# Patient Record
Sex: FEMALE | Race: WHITE | NOT HISPANIC OR LATINO | URBAN - METROPOLITAN AREA
[De-identification: names, ages, dates, MRNs, and addresses within clinical notes are randomized per-mention and may not be internally consistent; named-entity substitution may affect disease eponyms.]

---

## 2022-02-17 ENCOUNTER — INPATIENT (INPATIENT)
Facility: HOSPITAL | Age: 62
LOS: 2 days | Discharge: HOME | End: 2022-02-20
Attending: INTERNAL MEDICINE | Admitting: INTERNAL MEDICINE
Payer: MEDICAID

## 2022-02-17 VITALS
HEART RATE: 98 BPM | RESPIRATION RATE: 18 BRPM | OXYGEN SATURATION: 99 % | WEIGHT: 184.09 LBS | SYSTOLIC BLOOD PRESSURE: 132 MMHG | DIASTOLIC BLOOD PRESSURE: 67 MMHG | TEMPERATURE: 98 F

## 2022-02-17 LAB
ALBUMIN SERPL ELPH-MCNC: 4.1 G/DL — SIGNIFICANT CHANGE UP (ref 3.5–5.2)
ALP SERPL-CCNC: 42 U/L — SIGNIFICANT CHANGE UP (ref 30–115)
ALT FLD-CCNC: 16 U/L — SIGNIFICANT CHANGE UP (ref 0–41)
ANION GAP SERPL CALC-SCNC: 17 MMOL/L — HIGH (ref 7–14)
APTT BLD: 25.1 SEC — LOW (ref 27–39.2)
AST SERPL-CCNC: 16 U/L — SIGNIFICANT CHANGE UP (ref 0–41)
BASOPHILS # BLD AUTO: 0.03 K/UL — SIGNIFICANT CHANGE UP (ref 0–0.2)
BASOPHILS # BLD AUTO: 0.04 K/UL — SIGNIFICANT CHANGE UP (ref 0–0.2)
BASOPHILS NFR BLD AUTO: 0.3 % — SIGNIFICANT CHANGE UP (ref 0–1)
BASOPHILS NFR BLD AUTO: 0.4 % — SIGNIFICANT CHANGE UP (ref 0–1)
BILIRUB SERPL-MCNC: 0.8 MG/DL — SIGNIFICANT CHANGE UP (ref 0.2–1.2)
BLD GP AB SCN SERPL QL: SIGNIFICANT CHANGE UP
BUN SERPL-MCNC: 11 MG/DL — SIGNIFICANT CHANGE UP (ref 10–20)
CALCIUM SERPL-MCNC: 9.1 MG/DL — SIGNIFICANT CHANGE UP (ref 8.5–10.1)
CHLORIDE SERPL-SCNC: 98 MMOL/L — SIGNIFICANT CHANGE UP (ref 98–110)
CO2 SERPL-SCNC: 19 MMOL/L — SIGNIFICANT CHANGE UP (ref 17–32)
CREAT SERPL-MCNC: <0.5 MG/DL — LOW (ref 0.7–1.5)
EOSINOPHIL # BLD AUTO: 0.03 K/UL — SIGNIFICANT CHANGE UP (ref 0–0.7)
EOSINOPHIL # BLD AUTO: 0.04 K/UL — SIGNIFICANT CHANGE UP (ref 0–0.7)
EOSINOPHIL NFR BLD AUTO: 0.3 % — SIGNIFICANT CHANGE UP (ref 0–8)
EOSINOPHIL NFR BLD AUTO: 0.4 % — SIGNIFICANT CHANGE UP (ref 0–8)
GLUCOSE SERPL-MCNC: 229 MG/DL — HIGH (ref 70–99)
HCT VFR BLD CALC: 27.8 % — LOW (ref 37–47)
HCT VFR BLD CALC: 33.6 % — LOW (ref 37–47)
HGB BLD-MCNC: 11.1 G/DL — LOW (ref 12–16)
HGB BLD-MCNC: 9.4 G/DL — LOW (ref 12–16)
IMM GRANULOCYTES NFR BLD AUTO: 0.4 % — HIGH (ref 0.1–0.3)
IMM GRANULOCYTES NFR BLD AUTO: 0.6 % — HIGH (ref 0.1–0.3)
INR BLD: 1.01 RATIO — SIGNIFICANT CHANGE UP (ref 0.65–1.3)
LACTATE SERPL-SCNC: 2 MMOL/L — SIGNIFICANT CHANGE UP (ref 0.7–2)
LIDOCAIN IGE QN: 14 U/L — SIGNIFICANT CHANGE UP (ref 7–60)
LYMPHOCYTES # BLD AUTO: 1.82 K/UL — SIGNIFICANT CHANGE UP (ref 1.2–3.4)
LYMPHOCYTES # BLD AUTO: 16.9 % — LOW (ref 20.5–51.1)
LYMPHOCYTES # BLD AUTO: 2.16 K/UL — SIGNIFICANT CHANGE UP (ref 1.2–3.4)
LYMPHOCYTES # BLD AUTO: 26.6 % — SIGNIFICANT CHANGE UP (ref 20.5–51.1)
MCHC RBC-ENTMCNC: 32.7 PG — HIGH (ref 27–31)
MCHC RBC-ENTMCNC: 33 G/DL — SIGNIFICANT CHANGE UP (ref 32–37)
MCHC RBC-ENTMCNC: 33.3 PG — HIGH (ref 27–31)
MCHC RBC-ENTMCNC: 33.8 G/DL — SIGNIFICANT CHANGE UP (ref 32–37)
MCV RBC AUTO: 98.6 FL — SIGNIFICANT CHANGE UP (ref 81–99)
MCV RBC AUTO: 99.1 FL — HIGH (ref 81–99)
MONOCYTES # BLD AUTO: 0.73 K/UL — HIGH (ref 0.1–0.6)
MONOCYTES # BLD AUTO: 1.26 K/UL — HIGH (ref 0.1–0.6)
MONOCYTES NFR BLD AUTO: 10.7 % — HIGH (ref 1.7–9.3)
MONOCYTES NFR BLD AUTO: 9.8 % — HIGH (ref 1.7–9.3)
NEUTROPHILS # BLD AUTO: 4.19 K/UL — SIGNIFICANT CHANGE UP (ref 1.4–6.5)
NEUTROPHILS # BLD AUTO: 9.22 K/UL — HIGH (ref 1.4–6.5)
NEUTROPHILS NFR BLD AUTO: 61.5 % — SIGNIFICANT CHANGE UP (ref 42.2–75.2)
NEUTROPHILS NFR BLD AUTO: 72.1 % — SIGNIFICANT CHANGE UP (ref 42.2–75.2)
NRBC # BLD: 0 /100 WBCS — SIGNIFICANT CHANGE UP (ref 0–0)
NRBC # BLD: 0 /100 WBCS — SIGNIFICANT CHANGE UP (ref 0–0)
PLATELET # BLD AUTO: 174 K/UL — SIGNIFICANT CHANGE UP (ref 130–400)
PLATELET # BLD AUTO: 223 K/UL — SIGNIFICANT CHANGE UP (ref 130–400)
POTASSIUM SERPL-MCNC: 4 MMOL/L — SIGNIFICANT CHANGE UP (ref 3.5–5)
POTASSIUM SERPL-SCNC: 4 MMOL/L — SIGNIFICANT CHANGE UP (ref 3.5–5)
PROT SERPL-MCNC: 6.3 G/DL — SIGNIFICANT CHANGE UP (ref 6–8)
PROTHROM AB SERPL-ACNC: 11.6 SEC — SIGNIFICANT CHANGE UP (ref 9.95–12.87)
RBC # BLD: 2.82 M/UL — LOW (ref 4.2–5.4)
RBC # BLD: 3.39 M/UL — LOW (ref 4.2–5.4)
RBC # FLD: 12.7 % — SIGNIFICANT CHANGE UP (ref 11.5–14.5)
RBC # FLD: 12.9 % — SIGNIFICANT CHANGE UP (ref 11.5–14.5)
SARS-COV-2 RNA SPEC QL NAA+PROBE: DETECTED
SODIUM SERPL-SCNC: 134 MMOL/L — LOW (ref 135–146)
WBC # BLD: 12.8 K/UL — HIGH (ref 4.8–10.8)
WBC # BLD: 6.83 K/UL — SIGNIFICANT CHANGE UP (ref 4.8–10.8)
WBC # FLD AUTO: 12.8 K/UL — HIGH (ref 4.8–10.8)
WBC # FLD AUTO: 6.83 K/UL — SIGNIFICANT CHANGE UP (ref 4.8–10.8)

## 2022-02-17 PROCEDURE — 99291 CRITICAL CARE FIRST HOUR: CPT

## 2022-02-17 PROCEDURE — 93010 ELECTROCARDIOGRAM REPORT: CPT

## 2022-02-17 PROCEDURE — 74174 CTA ABD&PLVS W/CONTRAST: CPT | Mod: 26,MA

## 2022-02-17 RX ORDER — CIPROFLOXACIN LACTATE 400MG/40ML
400 VIAL (ML) INTRAVENOUS ONCE
Refills: 0 | Status: COMPLETED | OUTPATIENT
Start: 2022-02-17 | End: 2022-02-17

## 2022-02-17 RX ORDER — PANTOPRAZOLE SODIUM 20 MG/1
8 TABLET, DELAYED RELEASE ORAL
Qty: 80 | Refills: 0 | Status: DISCONTINUED | OUTPATIENT
Start: 2022-02-17 | End: 2022-02-17

## 2022-02-17 RX ORDER — SODIUM CHLORIDE 9 MG/ML
1000 INJECTION INTRAMUSCULAR; INTRAVENOUS; SUBCUTANEOUS ONCE
Refills: 0 | Status: DISCONTINUED | OUTPATIENT
Start: 2022-02-17 | End: 2022-02-17

## 2022-02-17 RX ORDER — SODIUM CHLORIDE 9 MG/ML
1000 INJECTION, SOLUTION INTRAVENOUS ONCE
Refills: 0 | Status: DISCONTINUED | OUTPATIENT
Start: 2022-02-17 | End: 2022-02-17

## 2022-02-17 RX ORDER — METRONIDAZOLE 500 MG
500 TABLET ORAL ONCE
Refills: 0 | Status: COMPLETED | OUTPATIENT
Start: 2022-02-17 | End: 2022-02-17

## 2022-02-17 RX ADMIN — Medication 100 MILLIGRAM(S): at 23:12

## 2022-02-17 RX ADMIN — Medication 200 MILLIGRAM(S): at 23:12

## 2022-02-17 NOTE — ED PROVIDER NOTE - ATTENDING CONTRIBUTION TO CARE
60 y/o female h/o DM, LISA, non-smoker, denies ETOH abuse p/w left sided abdominal pain x 2-3 days, persistent, radiates to rst of abdomen, denies modifying factors, today had multiple episodes of dark blood per rectum, + weakness, dizziness, light-headedness, denies hematemesis, coffee grounds, melena, anorexia, fever,  chest pain, palpitations, near syncope or syncope, SOB, n/v/d or other associated complaints at present.     No old chart available for review.  I have reviewed and agree with the initial nursing note, except as documented in my note.    VSS, awake, alert, no scleral icterus, oropharynx clear, mmm, no jaundice, skin rash or lesions, chest CTAB, non-labored breathing, no w/r/r, +S1/S2, RRR, no m/r/g, abdomen soft, mild ttp LLQ, ND, +BS, no hernias or distention, no pulsatile masses or bruits appreciated, no CVA tenderness, rectal: + gross blood, no peripheral edema or deformities, alert, clear speech.

## 2022-02-17 NOTE — ED ADULT TRIAGE NOTE - PATIENT ON (OXYGEN DELIVERY METHOD)
Discussed condition and exacerbating conditions/situations (e.g., dry/arid environments, overhead fans, air conditioners, side effect of medications). room air

## 2022-02-17 NOTE — ED ADULT TRIAGE NOTE - CHIEF COMPLAINT QUOTE
Patient complaining of left sided abdominal pain x2 days. Today patient had 6 bloody bowel movements.

## 2022-02-17 NOTE — ED PROVIDER NOTE - PHYSICAL EXAMINATION
Physical Exam    Vital Signs: I have reviewed the initial vital signs.  Constitutional: well-nourished, appears stated age, no acute distress. (+) pt is diaphoretic  Eyes: Conjunctiva pink, Sclera clear  ENT: Oropharynx is clear with lesions. uvula midline. no tonsillar erythema, edema, or exudates. no stridor. pta pta. floor of the mouth is soft without pus.   Cardiovascular: S1 and S2, regular rate, regular rhythm, well-perfused extremities, radial pulses equal and 2+ b/l.   Respiratory: unlabored respiratory effort, clear to auscultation bilaterally no wheezing, rales and rhonchi. pt is speaking full sentences. no accessory muscle use.   Gastrointestinal: soft, (+) mild llq tenderness, nondistended abdomen, no pulsatile mass, normal bowl sounds, no rebound, no guarding, no organomegaly.   Rectal: Pt having active bloody bowel movement with blood clots in the ED.   Musculoskeletal: supple neck, no lower extremity edema, no calf tenderness. FROM of b/l upper and lower extremities  Integumentary: warm, dry, no rash  Neurologic: awake, alert, cranial nerves II-XII grossly intact, extremities’ motor and sensory functions grossly intact.  Psychiatric: appropriate mood, appropriate affect

## 2022-02-17 NOTE — ED PROVIDER NOTE - CARE PLAN
1 Principal Discharge DX:	Rectal bleeding  Secondary Diagnosis:	Low hemoglobin  Secondary Diagnosis:	Diverticulitis

## 2022-02-17 NOTE — ED PROVIDER NOTE - CRITICAL CARE ATTENDING CONTRIBUTION TO CARE
I have personally performed a face to face diagnostic evaluation on this patient. I have reviewed the ACP note and agree with the history, exam and plan of care, except as noted.     60 y/o female h/o DM, LISA, non-smoker, denies ETOH abuse p/w left sided abdominal pain x 2-3 days, persistent, radiates to rst of abdomen, denies modifying factors, today had multiple episodes of dark blood per rectum, + weakness, dizziness, light-headedness, denies hematemesis, coffee grounds, melena, anorexia, fever,  chest pain, palpitations, near syncope or syncope, SOB, n/v/d or other associated complaints at present.     No old chart available for review.  I have reviewed and agree with the initial nursing note, except as documented in my note.    VSS, awake, alert, no scleral icterus, oropharynx clear, mmm, no jaundice, skin rash or lesions, chest CTAB, non-labored breathing, no w/r/r, +S1/S2, RRR, no m/r/g, abdomen soft, mild ttp LLQ, ND, +BS, no hernias or distention, no pulsatile masses or bruits appreciated, no CVA tenderness, rectal: + gross blood, no peripheral edema or deformities, alert, clear speech.

## 2022-02-17 NOTE — CONSULT NOTE ADULT - ASSESSMENT
IMPRESSION/PLAN:  GI Bleed  diverticulitis  COVID +  DM      Pt w GI bleed and continues to pass blood, BP improved after fluid bolus.  Hgb dropped after fluid bolus and continued bloody BM, possible dilutional drop v continued bleeding.  Pt to receive PRBC's.  Monitor serial cbc.  GI is aware of the patient and initial plan is for possible colonoscopy/egd in am.  Keep NPO. If pt continues to bleed or deteriorates, more urgent intervention may be required. Would treat w PPI. As for the pt being COVID +, she is not sob or hypoxemic.  obtain cxr.  Consider RDV/ decadron. Obtain ID eval.  Monitor in ICU.    CNS: avoid sedation    HEENT: oral care    PULMONARY: monitor O2 sat    CARDIOVASCULAR: monitor BP    GI: GI prophylaxis.  NPO    RENAL: monitor UO/creat    INFECTIOUS DISEASE: ID eval for possible RDV, decadron    HEMATOLOGICAL:  DVT prophylaxis.    ENDOCRINE:  Follow up FS.  Insulin protocol if needed.            CRITICAL CARE TIME SPENT: 30 minutes

## 2022-02-17 NOTE — ED ADULT NURSE NOTE - NSIMPLEMENTINTERV_GEN_ALL_ED
Implemented All Fall Risk Interventions:  [place] to call system. Call bell, personal items and telephone within reach. Instruct patient to call for assistance. Room bathroom lighting operational. Non-slip footwear when patient is off stretcher. Physically safe environment: no spills, clutter or unnecessary equipment. Stretcher in lowest position, wheels locked, appropriate side rails in place. Provide visual cue, wrist band, yellow gown, etc. Monitor gait and stability. Monitor for mental status changes and reorient to person, place, and time. Review medications for side effects contributing to fall risk. Reinforce activity limits and safety measures with patient and family.

## 2022-02-17 NOTE — ED PROVIDER NOTE - NS ED ROS FT
CONST: No fever, chills or bodyaches  EYES: No pain, redness, drainage or visual changes.  ENT: No ear pain or discharge, nasal discharge or congestion. No sore throat  CARD: No chest pain, palpitations  RESP: No SOB, cough, hemoptysis. No hx of asthma or COPD  GI: (+) llq abdominal pain, and bloody stools  : No urinary symptoms  MS: No joint pain, back pain or extremity pain/injury  SKIN: No rashes  NEURO: No headache, dizziness, paresthesias or LOC

## 2022-02-17 NOTE — CONSULT NOTE ADULT - SUBJECTIVE AND OBJECTIVE BOX
Patient is a 61y old  Female who presents with a chief complaint of abd pain x 2 days and bloody BM's w clots today    HPI: 62 yo female PMHx sig for DM, cervical ca,  HPV, hx LISA, presents for 2-3 days of LLQ abd pain, no fever or chills, + bloody BM  today w clots x8? as per patient. denies cough/fever, chills, sob, reports she is vaccinated w Moderna x 3.  She has tested + for COVID as well.      PAST MEDICAL & SURGICAL HISTORY:    Allergies    No Known Allergies    Intolerances      FAMILY HISTORY:    Home Medications:    Occupation:  [email or web address]: Denied  Smoking: Denied  Drug Use: Denied  Marital Status:         ROS: as in HPI; All other systems reviewed are negative    ICU Vital Signs Last 24 Hrs  T(C): 36.9 (17 Feb 2022 21:44), Max: 36.9 (17 Feb 2022 21:44)  T(F): 98.5 (17 Feb 2022 21:44), Max: 98.5 (17 Feb 2022 21:44)  HR: 80 (17 Feb 2022 23:27) (80 - 98)  BP: 135/60 (17 Feb 2022 23:27) (115/61 - 135/60)  BP(mean): 86 (17 Feb 2022 23:27) (86 - 86)  ABP: --  ABP(mean): --  RR: 16 (17 Feb 2022 23:27) (16 - 18)  SpO2: 97% (17 Feb 2022 23:27) (97% - 99%)        Physical Examination:    General: No acute distress.  Alert, oriented, interactive, nonfocal    HEENT: Pupils equal, reactive to light.  Symmetric.    PULM: Clear to auscultation bilaterally, no significant sputum production    CVS: Regular rate and rhythm, no murmurs, rubs, or gallops    ABD: Soft, nondistended, mild L LQ tenderness, normoactive bowel sounds, no masses    EXT: No edema, nontender    SKIN: Warm and well perfused, no rashes noted.              I&O's Detail        LABS:                        9.4    6.83  )-----------( 174      ( 17 Feb 2022 22:20 )             27.8     17 Feb 2022 16:31    134    |  98     |  11     ----------------------------<  229    4.0     |  19     |  <0.5     Ca    9.1        17 Feb 2022 16:31    TPro  6.3    /  Alb  4.1    /  TBili  0.8    /  DBili  x      /  AST  16     /  ALT  16     /  AlkPhos  42     17 Feb 2022 16:31  Amylase x     lipase 14             CAPILLARY BLOOD GLUCOSE      POCT Blood Glucose.: 233 mg/dL (17 Feb 2022 16:06)    PT/INR - ( 17 Feb 2022 16:31 )   PT: 11.60 sec;   INR: 1.01 ratio         PTT - ( 17 Feb 2022 16:31 )  PTT:25.1 sec    Culture    Lactate, Blood: 2.0 mmol/L (02-17-22 @ 16:31)      MEDICATIONS  (STANDING):    MEDICATIONS  (PRN):        RADIOLOGY: ***   ct angio abd/pelvis    IMPRESSION:    Hepatomegaly. Hepatic steatosis.    No evidence of active GI bleeding.    There is an eccentric 2.1 x 2.4 cm soft tissue density along the lateral   aspect of the proximal sigmoid colon(606/132). Several diverticula noted   in the area. There is mild wall thickening and mild pericolonic   stranding. Findings are consistent with diverticulitis. Part of the soft   tissue density adjacent to the sigmoid colon may represent the left ovary   (if it wasn't removed at the time of the hysterectomy). Colonoscopy may   be useful to further evaluate this region to rule out an underlying   colonic mass.

## 2022-02-17 NOTE — ED PROVIDER NOTE - PROGRESS NOTE DETAILS
BH: Due to the need for continuous patient care in the emergency department, the times documented are the time of computer entry, not necessarily the time or order in which the event occurred.    After initial eval pt became hypotensive, pale, diaphoretic, transferred to ED crit. Dr Sheikh aware. FF: pt in wheelchair. pt movement to stretcher in the main, and became more pale and diaphoretic and lethargic. pt brought to crit. two 18 gauge ivs placed pt given fluids bp improved. pt as her baseline able to have conversation. pt mother is at bedside. gi consulted. advised if pt become unstable or cta is positive with colonoscopy pt today. if not pt is stable and cta is normal plan for colonoscopy tomorrow. gi reports just give fluids at this time. I had pt sign blood transfusion consent form just in case. FF: spoke with dr. castro, pt is accepted to icu under dr. andrade. spoke with icu resident dr. daugherty.

## 2022-02-17 NOTE — ED PROVIDER NOTE - CLINICAL SUMMARY MEDICAL DECISION MAKING FREE TEXT BOX
61-year-old female presents to the ED for left lower quadrant abdominal pain and bloody stools x8.  Patient was initially seen in the main ED and was noted to be hypotensive and pale and brought to the critical care area of the ED and care was transitioned to me.  Repeat vitals noted to be within normal limits with physical exam noted to have pale conjunctive and dark stools on rectal exam.  Concern for GI bleed.  We obtained labs, CT imaging.  Labs revealed anemia–11 which is lower from baseline of 14.  CT abdomen pelvis revealed diverticulitis. GI consulted with recommendations for PPI and n.p.o. for scope tomorrow. Repeat hemoglobin revealed a drop from 11>9.  Given 1 unit of PRBC.  Antibiotics given ,consulted ICU and admitted to critical care.

## 2022-02-17 NOTE — ED PROVIDER NOTE - OBJECTIVE STATEMENT
60 y/o female with a PMH of DM, and LISA s/p HPV and cervical cancer presents to the ED for evaluation of left lower quadrant pain x 4 days, and 8 episodes of bloody stool that began this morning. upon arrival in the ED pt is diaphoretic pale and bp was 99/60s. pt reports she had a routine colonoscopy done about 10 years ago. pt denies fever, chills, chest pain, sob, headache, back pain, n/v/c, hx of blood transfusions, additional abdominal surgeries, recent trauma, use of blood thinners, blood in the urine, burning or pain with urination, numbness, or tingling.

## 2022-02-18 LAB
A1C WITH ESTIMATED AVERAGE GLUCOSE RESULT: 6.5 % — HIGH (ref 4–5.6)
ABO RH CONFIRMATION: SIGNIFICANT CHANGE UP
ALBUMIN SERPL ELPH-MCNC: 3.4 G/DL — LOW (ref 3.5–5.2)
ALP SERPL-CCNC: 31 U/L — SIGNIFICANT CHANGE UP (ref 30–115)
ALT FLD-CCNC: 11 U/L — SIGNIFICANT CHANGE UP (ref 0–41)
ANION GAP SERPL CALC-SCNC: 12 MMOL/L — SIGNIFICANT CHANGE UP (ref 7–14)
APPEARANCE UR: CLEAR — SIGNIFICANT CHANGE UP
AST SERPL-CCNC: 11 U/L — SIGNIFICANT CHANGE UP (ref 0–41)
BASOPHILS # BLD AUTO: 0.03 K/UL — SIGNIFICANT CHANGE UP (ref 0–0.2)
BASOPHILS # BLD AUTO: 0.03 K/UL — SIGNIFICANT CHANGE UP (ref 0–0.2)
BASOPHILS NFR BLD AUTO: 0.6 % — SIGNIFICANT CHANGE UP (ref 0–1)
BASOPHILS NFR BLD AUTO: 0.7 % — SIGNIFICANT CHANGE UP (ref 0–1)
BILIRUB SERPL-MCNC: 0.7 MG/DL — SIGNIFICANT CHANGE UP (ref 0.2–1.2)
BILIRUB UR-MCNC: NEGATIVE — SIGNIFICANT CHANGE UP
BUN SERPL-MCNC: 7 MG/DL — LOW (ref 10–20)
CALCIUM SERPL-MCNC: 7.8 MG/DL — LOW (ref 8.5–10.1)
CHLORIDE SERPL-SCNC: 108 MMOL/L — SIGNIFICANT CHANGE UP (ref 98–110)
CO2 SERPL-SCNC: 20 MMOL/L — SIGNIFICANT CHANGE UP (ref 17–32)
COLOR SPEC: YELLOW — SIGNIFICANT CHANGE UP
CREAT SERPL-MCNC: <0.5 MG/DL — LOW (ref 0.7–1.5)
DIFF PNL FLD: NEGATIVE — SIGNIFICANT CHANGE UP
EOSINOPHIL # BLD AUTO: 0.06 K/UL — SIGNIFICANT CHANGE UP (ref 0–0.7)
EOSINOPHIL # BLD AUTO: 0.07 K/UL — SIGNIFICANT CHANGE UP (ref 0–0.7)
EOSINOPHIL NFR BLD AUTO: 1.2 % — SIGNIFICANT CHANGE UP (ref 0–8)
EOSINOPHIL NFR BLD AUTO: 1.5 % — SIGNIFICANT CHANGE UP (ref 0–8)
ESTIMATED AVERAGE GLUCOSE: 140 MG/DL — HIGH (ref 68–114)
GLUCOSE BLDC GLUCOMTR-MCNC: 124 MG/DL — HIGH (ref 70–99)
GLUCOSE BLDC GLUCOMTR-MCNC: 131 MG/DL — HIGH (ref 70–99)
GLUCOSE BLDC GLUCOMTR-MCNC: 146 MG/DL — HIGH (ref 70–99)
GLUCOSE BLDC GLUCOMTR-MCNC: 185 MG/DL — HIGH (ref 70–99)
GLUCOSE SERPL-MCNC: 171 MG/DL — HIGH (ref 70–99)
GLUCOSE UR QL: NEGATIVE — SIGNIFICANT CHANGE UP
HCT VFR BLD CALC: 27.1 % — LOW (ref 37–47)
HCT VFR BLD CALC: 27.3 % — LOW (ref 37–47)
HCV AB S/CO SERPL IA: 0.03 COI — SIGNIFICANT CHANGE UP
HCV AB SERPL-IMP: SIGNIFICANT CHANGE UP
HGB BLD-MCNC: 9.1 G/DL — LOW (ref 12–16)
HGB BLD-MCNC: 9.3 G/DL — LOW (ref 12–16)
IMM GRANULOCYTES NFR BLD AUTO: 0.2 % — SIGNIFICANT CHANGE UP (ref 0.1–0.3)
IMM GRANULOCYTES NFR BLD AUTO: 0.4 % — HIGH (ref 0.1–0.3)
KETONES UR-MCNC: SIGNIFICANT CHANGE UP
LEUKOCYTE ESTERASE UR-ACNC: NEGATIVE — SIGNIFICANT CHANGE UP
LYMPHOCYTES # BLD AUTO: 1.33 K/UL — SIGNIFICANT CHANGE UP (ref 1.2–3.4)
LYMPHOCYTES # BLD AUTO: 1.59 K/UL — SIGNIFICANT CHANGE UP (ref 1.2–3.4)
LYMPHOCYTES # BLD AUTO: 26.3 % — SIGNIFICANT CHANGE UP (ref 20.5–51.1)
LYMPHOCYTES # BLD AUTO: 34.6 % — SIGNIFICANT CHANGE UP (ref 20.5–51.1)
MCHC RBC-ENTMCNC: 32.6 PG — HIGH (ref 27–31)
MCHC RBC-ENTMCNC: 33 PG — HIGH (ref 27–31)
MCHC RBC-ENTMCNC: 33.3 G/DL — SIGNIFICANT CHANGE UP (ref 32–37)
MCHC RBC-ENTMCNC: 34.3 G/DL — SIGNIFICANT CHANGE UP (ref 32–37)
MCV RBC AUTO: 96.1 FL — SIGNIFICANT CHANGE UP (ref 81–99)
MCV RBC AUTO: 97.8 FL — SIGNIFICANT CHANGE UP (ref 81–99)
MONOCYTES # BLD AUTO: 0.46 K/UL — SIGNIFICANT CHANGE UP (ref 0.1–0.6)
MONOCYTES # BLD AUTO: 0.56 K/UL — SIGNIFICANT CHANGE UP (ref 0.1–0.6)
MONOCYTES NFR BLD AUTO: 10 % — HIGH (ref 1.7–9.3)
MONOCYTES NFR BLD AUTO: 11.1 % — HIGH (ref 1.7–9.3)
NEUTROPHILS # BLD AUTO: 2.43 K/UL — SIGNIFICANT CHANGE UP (ref 1.4–6.5)
NEUTROPHILS # BLD AUTO: 3.05 K/UL — SIGNIFICANT CHANGE UP (ref 1.4–6.5)
NEUTROPHILS NFR BLD AUTO: 53 % — SIGNIFICANT CHANGE UP (ref 42.2–75.2)
NEUTROPHILS NFR BLD AUTO: 60.4 % — SIGNIFICANT CHANGE UP (ref 42.2–75.2)
NITRITE UR-MCNC: NEGATIVE — SIGNIFICANT CHANGE UP
NRBC # BLD: 0 /100 WBCS — SIGNIFICANT CHANGE UP (ref 0–0)
NRBC # BLD: 0 /100 WBCS — SIGNIFICANT CHANGE UP (ref 0–0)
PH UR: 6 — SIGNIFICANT CHANGE UP (ref 5–8)
PLATELET # BLD AUTO: 152 K/UL — SIGNIFICANT CHANGE UP (ref 130–400)
PLATELET # BLD AUTO: 164 K/UL — SIGNIFICANT CHANGE UP (ref 130–400)
POTASSIUM SERPL-MCNC: 3.7 MMOL/L — SIGNIFICANT CHANGE UP (ref 3.5–5)
POTASSIUM SERPL-SCNC: 3.7 MMOL/L — SIGNIFICANT CHANGE UP (ref 3.5–5)
PROT SERPL-MCNC: 4.7 G/DL — LOW (ref 6–8)
PROT UR-MCNC: SIGNIFICANT CHANGE UP
RBC # BLD: 2.79 M/UL — LOW (ref 4.2–5.4)
RBC # BLD: 2.82 M/UL — LOW (ref 4.2–5.4)
RBC # FLD: 12.9 % — SIGNIFICANT CHANGE UP (ref 11.5–14.5)
RBC # FLD: 13 % — SIGNIFICANT CHANGE UP (ref 11.5–14.5)
SODIUM SERPL-SCNC: 140 MMOL/L — SIGNIFICANT CHANGE UP (ref 135–146)
SP GR SPEC: >1.05 (ref 1.01–1.03)
UROBILINOGEN FLD QL: ABNORMAL
WBC # BLD: 4.59 K/UL — LOW (ref 4.8–10.8)
WBC # BLD: 5.05 K/UL — SIGNIFICANT CHANGE UP (ref 4.8–10.8)
WBC # FLD AUTO: 4.59 K/UL — LOW (ref 4.8–10.8)
WBC # FLD AUTO: 5.05 K/UL — SIGNIFICANT CHANGE UP (ref 4.8–10.8)

## 2022-02-18 PROCEDURE — 99222 1ST HOSP IP/OBS MODERATE 55: CPT

## 2022-02-18 PROCEDURE — 71045 X-RAY EXAM CHEST 1 VIEW: CPT | Mod: 26

## 2022-02-18 PROCEDURE — 99223 1ST HOSP IP/OBS HIGH 75: CPT

## 2022-02-18 RX ORDER — CIPROFLOXACIN LACTATE 400MG/40ML
400 VIAL (ML) INTRAVENOUS EVERY 12 HOURS
Refills: 0 | Status: DISCONTINUED | OUTPATIENT
Start: 2022-02-18 | End: 2022-02-18

## 2022-02-18 RX ORDER — LISINOPRIL 2.5 MG/1
1 TABLET ORAL
Qty: 0 | Refills: 0 | DISCHARGE

## 2022-02-18 RX ORDER — SODIUM CHLORIDE 9 MG/ML
1000 INJECTION, SOLUTION INTRAVENOUS
Refills: 0 | Status: DISCONTINUED | OUTPATIENT
Start: 2022-02-18 | End: 2022-02-20

## 2022-02-18 RX ORDER — DEXTROSE 50 % IN WATER 50 %
15 SYRINGE (ML) INTRAVENOUS ONCE
Refills: 0 | Status: DISCONTINUED | OUTPATIENT
Start: 2022-02-18 | End: 2022-02-20

## 2022-02-18 RX ORDER — CEFTRIAXONE 500 MG/1
1000 INJECTION, POWDER, FOR SOLUTION INTRAMUSCULAR; INTRAVENOUS EVERY 24 HOURS
Refills: 0 | Status: DISCONTINUED | OUTPATIENT
Start: 2022-02-18 | End: 2022-02-19

## 2022-02-18 RX ORDER — DEXTROSE 50 % IN WATER 50 %
25 SYRINGE (ML) INTRAVENOUS ONCE
Refills: 0 | Status: DISCONTINUED | OUTPATIENT
Start: 2022-02-18 | End: 2022-02-20

## 2022-02-18 RX ORDER — DEXTROSE 50 % IN WATER 50 %
12.5 SYRINGE (ML) INTRAVENOUS ONCE
Refills: 0 | Status: DISCONTINUED | OUTPATIENT
Start: 2022-02-18 | End: 2022-02-20

## 2022-02-18 RX ORDER — FENOFIBRATE,MICRONIZED 130 MG
1 CAPSULE ORAL
Qty: 0 | Refills: 0 | DISCHARGE

## 2022-02-18 RX ORDER — SITAGLIPTIN AND METFORMIN HYDROCHLORIDE 500; 50 MG/1; MG/1
1 TABLET, FILM COATED ORAL
Qty: 0 | Refills: 0 | DISCHARGE

## 2022-02-18 RX ORDER — METRONIDAZOLE 500 MG
500 TABLET ORAL EVERY 8 HOURS
Refills: 0 | Status: DISCONTINUED | OUTPATIENT
Start: 2022-02-18 | End: 2022-02-20

## 2022-02-18 RX ORDER — PANTOPRAZOLE SODIUM 20 MG/1
40 TABLET, DELAYED RELEASE ORAL EVERY 12 HOURS
Refills: 0 | Status: DISCONTINUED | OUTPATIENT
Start: 2022-02-18 | End: 2022-02-19

## 2022-02-18 RX ORDER — CHLORHEXIDINE GLUCONATE 213 G/1000ML
1 SOLUTION TOPICAL
Refills: 0 | Status: DISCONTINUED | OUTPATIENT
Start: 2022-02-18 | End: 2022-02-20

## 2022-02-18 RX ORDER — INSULIN LISPRO 100/ML
VIAL (ML) SUBCUTANEOUS
Refills: 0 | Status: DISCONTINUED | OUTPATIENT
Start: 2022-02-18 | End: 2022-02-20

## 2022-02-18 RX ORDER — SODIUM CHLORIDE 9 MG/ML
1000 INJECTION INTRAMUSCULAR; INTRAVENOUS; SUBCUTANEOUS
Refills: 0 | Status: DISCONTINUED | OUTPATIENT
Start: 2022-02-18 | End: 2022-02-19

## 2022-02-18 RX ORDER — ESTROGENS, CONJUGATED 0.625 MG
1 TABLET ORAL
Qty: 0 | Refills: 0 | DISCHARGE

## 2022-02-18 RX ORDER — GLUCAGON INJECTION, SOLUTION 0.5 MG/.1ML
1 INJECTION, SOLUTION SUBCUTANEOUS ONCE
Refills: 0 | Status: DISCONTINUED | OUTPATIENT
Start: 2022-02-18 | End: 2022-02-20

## 2022-02-18 RX ORDER — INFLUENZA VIRUS VACCINE 15; 15; 15; 15 UG/.5ML; UG/.5ML; UG/.5ML; UG/.5ML
0.5 SUSPENSION INTRAMUSCULAR ONCE
Refills: 0 | Status: DISCONTINUED | OUTPATIENT
Start: 2022-02-18 | End: 2022-02-20

## 2022-02-18 RX ORDER — INSULIN GLARGINE 100 [IU]/ML
15 INJECTION, SOLUTION SUBCUTANEOUS AT BEDTIME
Refills: 0 | Status: DISCONTINUED | OUTPATIENT
Start: 2022-02-18 | End: 2022-02-20

## 2022-02-18 RX ADMIN — Medication 100 MILLIGRAM(S): at 05:28

## 2022-02-18 RX ADMIN — Medication 2: at 07:25

## 2022-02-18 RX ADMIN — Medication 100 MILLIGRAM(S): at 22:13

## 2022-02-18 RX ADMIN — SODIUM CHLORIDE 60 MILLILITER(S): 9 INJECTION INTRAMUSCULAR; INTRAVENOUS; SUBCUTANEOUS at 01:14

## 2022-02-18 RX ADMIN — Medication 100 MILLIGRAM(S): at 14:04

## 2022-02-18 RX ADMIN — Medication 200 MILLIGRAM(S): at 05:28

## 2022-02-18 RX ADMIN — PANTOPRAZOLE SODIUM 40 MILLIGRAM(S): 20 TABLET, DELAYED RELEASE ORAL at 05:56

## 2022-02-18 RX ADMIN — CEFTRIAXONE 100 MILLIGRAM(S): 500 INJECTION, POWDER, FOR SOLUTION INTRAMUSCULAR; INTRAVENOUS at 12:39

## 2022-02-18 RX ADMIN — PANTOPRAZOLE SODIUM 40 MILLIGRAM(S): 20 TABLET, DELAYED RELEASE ORAL at 18:42

## 2022-02-18 NOTE — PATIENT PROFILE ADULT - FUNCTIONAL ASSESSMENT - BASIC MOBILITY 4.
CM met with Pt at bedside to discuss colostomy supplies  Pt reports that [facility] assisted prior  CM spoke to Bethany Doll in surgery who provided Pt with a prescription for supplies  CM provided Pt with information from [facility] on their program to assist with getting reimbursement for supplies through Medicare  4 = No assist / stand by assistance

## 2022-02-18 NOTE — H&P ADULT - NSHPLABSRESULTS_GEN_ALL_CORE
9.4    6.83  )-----------( 174      ( 17 Feb 2022 22:20 )             27.8   02-17    134<L>  |  98  |  11  ----------------------------<  229<H>  4.0   |  19  |  <0.5<L>    Ca    9.1      17 Feb 2022 16:31    TPro  6.3  /  Alb  4.1  /  TBili  0.8  /  DBili  x   /  AST  16  /  ALT  16  /  AlkPhos  42  02-17    < from: CT Angio Abdomen and Pelvis w/ IV Cont (02.17.22 @ 21:02) >    IMPRESSION:    Hepatomegaly. Hepatic steatosis.    No evidence of active GI bleeding.    There is an eccentric 2.1 x 2.4 cm soft tissue density along the lateral   aspect of the proximal sigmoid colon(606/132). Several diverticula noted   in the area. There is mild wall thickening and mild pericolonic   stranding. Findings are consistent with diverticulitis. Part of the soft   tissue density adjacent to the sigmoid colon may represent the left ovary   (if it wasn't removed at the time of the hysterectomy). Colonoscopy may   be useful to further evaluate this region to rule out an underlying   colonic mass.    --- End of Report ---    < end of copied text >

## 2022-02-18 NOTE — CONSULT NOTE ADULT - ATTENDING COMMENTS
pt who presents with BRBPR - CT shows diverticulitis and a likely mass in the sigmoid colon. pt will need colonoscopy, however she is high risk at the moment given current diverticulitis. no longer actively bleeding at the moment. if pt has recurrent bleed will need IR embolization. start abx for diverticulitis. colonoscopy in 6 weeks as outpt to r/o mass.
IMPRESSION:  LGIB   Diverticulosis with probable diverticulitis  Soft tissue mass along sigmoid  Asymptomatic COVID-19 + (vaccinated w/ Moderna x 3)  H/O LISA  H/O DM    Plan as outlined above

## 2022-02-18 NOTE — H&P ADULT - ASSESSMENT
60 yo female patient with PMHX of HTN, DM, DLD presents for hematochezia.     Impression  GIB  Diverticulitis  COVID, asymptomatic  DM  HTN     60 yo female patient with PMHX of HTN, DM, DLD presents for hematochezia.     Impression  GIB  Diverticulitis  COVID, asymptomatic  DM  HTN    PLAN:    CNS: Avoid  CNS depressant    HEENT: Oral care, HOB at 45,     PULMONARY: saturating well on RA, HOB at 45, monitor Spo2,     CARDIOVASCULAR: s/p 2 L of fluids and 1 PRBC, c/w gentle IVF, avoid volume overload    GI: protonix bid, NPO, c/s GI, plan for scope tomorrow    RENAL: I=0, monitor electrolytes and replete as needed,      INFECTIOUS DISEASE: asymptomatic fully vaccinated, no need for COVID targeted treatment, c/w cipro and flagyl    HEMATOLOGICAL:  DVT prophylaxis SCD, monitor H&H, transfuse as needed     ENDOCRINE:  Follow up FS.     MUSCULOSKELETAL: increase as tolerated    Code status: full    DISPO; MICU 62 yo female patient with PMHX of HTN, DM, DLD presents for hematochezia.     Impression  GIB  Diverticulitis  COVID, asymptomatic  DM  HTN    PLAN:    CNS: Avoid  CNS depressant    HEENT: Oral care, HOB at 45,     PULMONARY: saturating well on RA, HOB at 45, monitor Spo2,     CARDIOVASCULAR: s/p 2 L of fluids and 1 PRBC, c/w gentle IVF, avoid volume overload    GI: protonix bid, NPO, c/s GI, plan for scope tomorrow    RENAL: I=0, monitor electrolytes and replete as needed,      INFECTIOUS DISEASE: asymptomatic fully vaccinated, no need for COVID targeted treatment, c/w cipro and flagyl    HEMATOLOGICAL:  DVT prophylaxis SCD, monitor H&H, transfuse as needed     ENDOCRINE:  basal insulin, monitor BS    MUSCULOSKELETAL: increase as tolerated    Code status: full    DISPO; MICU

## 2022-02-18 NOTE — CONSULT NOTE ADULT - ASSESSMENT
#)Hematochezia likely due to diverticulosis   Hemodynamically stable  Baseline Hb 13 admitted with Hb of 11 dropped to 9  CT showed diverticulitis     Rec:  Keep NPO  Maintain active type and screen  Adequate fluid resuscitation (Keep SBP > 90)  Trend CBC every 8hrs and keep Hg > 8      incomplete note      #)Hematochezia likely due to diverticulosis   #)Diverticulitis/sigmoid colon mass  -Hemodynamically stable  -Baseline Hb 13 admitted with Hb of 11 dropped to 9  -CT showed There is an eccentric 2.1 x 2.4 cm soft tissue density along the lateral aspect of the proximal sigmoid colon(606/132). Several diverticula noted in the area. There is mild wall thickening and mild pericolonic stranding. Findings are consistent with diverticulitis.  -Last BM yesterday at 3 PM in the afternoon no BM after that     Rec:  clear liquid diet   Maintain active type and screen  Adequate fluid resuscitation (Keep SBP > 90)  Trend CBC every 8hrs and keep Hg > 8  c/w IV abx  No colonoscopy at this time given diverticulitis need colonoscopy 8 weeks after resolving diverticulitis to r/o malignancy (sigmoid mass in CT scan h/o oopherectomy and hysterectomy)  spoke to the patient and daughter about the plan

## 2022-02-18 NOTE — CONSULT NOTE ADULT - ASSESSMENT
IMPRESSION:  GIB probable LGIB 2/2 diverticulitis  Soft tissue mass along sigmoid colon possible etiology-Sigmoid colon mass vs abscess vs left ovary?  Asymptomatic COVID-19 + (vaccinated w/ Moderna x 3)  H/O LISA  H/O DM    PLAN:    CNS: Avoid CNS depressants    HEENT: Oral care    PULMONARY:  HOB @ 45 degrees. Aspiration Precautions . Incentive spirometry    CARDIOVASCULAR:  Keep I=O    GI: GI eval appreciated. Protonix iv q12.  Clear feeds as per GI    RENAL:  Follow up lytes.  Correct as needed    INFECTIOUS DISEASE: Follow up cultures. C/W Ciprofloxacin and flagyl. Procalcitonin    HEMATOLOGICAL:  DVT prophylaxis- SCDs for now.  Monitor cbc and coags. Transfuse to keep Hb >7.  D-dimer    ENDOCRINE:  Follow up FS.  Insulin protocol if needed. Hba1c     MUSCULOSKELETAL: OOB to chair    [place]: No  Lines: Peripheral IV  Code status: Full code  Disposition: SDU-ED3           IMPRESSION:  GIB probable LGIB 2/2 diverticulitis  Soft tissue mass along sigmoid colon possible etiology-Sigmoid colon mass vs abscess vs left ovary?  Asymptomatic COVID-19 + (vaccinated w/ Moderna x 3)  H/O LISA  H/O DM    PLAN:    CNS: Avoid CNS depressants    HEENT: Oral care    PULMONARY:  HOB @ 45 degrees. Aspiration Precautions . Incentive spirometry    CARDIOVASCULAR:  D/C IVF     GI: GI eval appreciated. Protonix iv q12.  Clear feeds as per GI    RENAL:  Follow up lytes.  Correct as needed    INFECTIOUS DISEASE: Follow up cultures. C/W Ciprofloxacin and flagyl. Procalcitonin    HEMATOLOGICAL:  DVT prophylaxis- SCDs for now.  Monitor cbc and coags. Transfuse to keep Hb >7.  D-dimer    ENDOCRINE:  Follow up FS.  Insulin protocol if needed. Hba1c     MUSCULOSKELETAL: OOB to chair    [place]: No  Lines: Peripheral IV  Code status: Full code  Disposition: SDU-ED3           IMPRESSION:  GIB probable LGIB 2/2 diverticulitis  Soft tissue mass along sigmoid colon possible etiology-Sigmoid colon mass vs abscess vs left ovary (if not removed during LISA)  Asymptomatic COVID-19 + (vaccinated w/ Moderna x 3)  H/O LISA  H/O DM    PLAN:    CNS: Avoid CNS depressants    HEENT: Oral care    PULMONARY:  HOB @ 45 degrees. Aspiration Precautions . Incentive spirometry    CARDIOVASCULAR:  D/C IVF     GI: GI eval appreciated. Protonix iv q12.  Clear feeds as per GI. Needs outpatient follow up of CT abdomen finding of sigmoid colon soft tissue density/mass    RENAL:  Follow up lytes.  Correct as needed    INFECTIOUS DISEASE: Follow up cultures. C/W Ciprofloxacin and flagyl. Procalcitonin    HEMATOLOGICAL:  DVT prophylaxis- SCDs for now.  Monitor cbc and coags. Transfuse to keep Hb >7.  D-dimer    ENDOCRINE:  Follow up FS.  Insulin protocol if needed. Hba1c     MUSCULOSKELETAL: OOB to chair    [place]: No  Lines: Peripheral IV  Code status: Full code  Disposition: SDU-ED3           IMPRESSION:  LGIB   Diverticulosis with probable diverticulitis  Soft tissue mass along sigmoid  Asymptomatic COVID-19 + (vaccinated w/ Moderna x 3)  H/O LISA  H/O DM    PLAN:    CNS: Avoid CNS depressants    HEENT: Oral care    PULMONARY:  HOB @ 45 degrees. Aspiration Precautions . Incentive spirometry    CARDIOVASCULAR:  D/C IVF.  Avoid overload.       GI: GI eval appreciated. Protonix PO Q24.  Clear feeds as per GI. Needs outpatient follow up of CT abdomen finding of sigmoid colon soft tissue density/mass    RENAL:  Follow up lytes.  Correct as needed    INFECTIOUS DISEASE: Follow up cultures. C/W Cefepime and flagyl. Procalcitonin    HEMATOLOGICAL:  DVT prophylaxis- SCDs for now.  Monitor cbc and coags. Transfuse to keep Hb >7.  D-dimer    ENDOCRINE:  Follow up FS.  Insulin protocol if needed. Hba1c     MUSCULOSKELETAL: OOB to chair    [place]: No  Lines: Peripheral IV  Code status: Full code  Disposition: SDU-ED3

## 2022-02-18 NOTE — H&P ADULT - HISTORY OF PRESENT ILLNESS
60 yo female patient with PMHX of HTN, DM, DLD presents for hematochezia. Patient started having bloody bowel movements today, first episode around noon. She had a total of 8 episodes before coming to the ED for eval. She reports feeling dizzy, pale, diaphoretic and weak prior to presentation, her symptoms have resolved at the time of encounter. She also endorses abdominal pain mainly in the lower quadrants, moderate in intensity and non radiating. No previous history of GI bleeding, PUD or other GI problems. She denied fevers, chills, nausea or vomiting, urinary or other associated symptoms. She tested positive for COVID in the ED but denied having any symptoms. She is fully vaccinated and received the booster.    In the ED VS were within normal.  initial hgb was 11, repeat level 6 hours after was 9.  CTA was negative for active GI bleed but did showed diverticulitis.  She received IVF, abx and 1 unit of PRBC.

## 2022-02-18 NOTE — CONSULT NOTE ADULT - SUBJECTIVE AND OBJECTIVE BOX
Patient is a 61y old  Female who presents with a chief complaint of hematochezia (18 Feb 2022 07:28)      HPI:  62 yo female patient with PMHX of HTN, DM, DLD presents for hematochezia. Patient started having bloody bowel movements today, first episode around noon. She had a total of 8 episodes before coming to the ED for eval. She reports feeling dizzy, pale, diaphoretic and weak prior to presentation, her symptoms have resolved at the time of encounter. She also endorses abdominal pain mainly in the lower quadrants, moderate in intensity and non radiating. No previous history of GI bleeding, PUD or other GI problems. She denied fevers, chills, nausea or vomiting, urinary or other associated symptoms. She tested positive for COVID in the ED but denied having any symptoms. She is fully vaccinated and received the booster.    In the ED VS were within normal.  initial hgb was 11, repeat level 6 hours after was 9.  CTA was negative for active GI bleed but did showed diverticulitis.  She received IVF, abx and 1 unit of PRBC. (18 Feb 2022 00:08)    MICU consulted for evaluation and management of GIB    PAST MEDICAL & SURGICAL HISTORY:  HTN (hypertension)    DM (diabetes mellitus)    Dyslipidemia    H/O: hysterectomy        SOCIAL HX:   Smoking denies                        ETOH  denies                          Other    FAMILY HISTORY:  :  No known cardiovacular family hisotry     Review Of Systems:     All ROS are negative except per HPI       Allergies    No Known Allergies    Intolerances          PHYSICAL EXAM    ICU Vital Signs Last 24 Hrs  T(C): 37.1 (18 Feb 2022 07:00), Max: 37.1 (18 Feb 2022 07:00)  T(F): 98.7 (18 Feb 2022 07:00), Max: 98.7 (18 Feb 2022 07:00)  HR: 78 (18 Feb 2022 08:00) (70 - 98)  BP: 121/59 (18 Feb 2022 08:00) (101/54 - 137/74)  BP(mean): 85 (18 Feb 2022 08:00) (74 - 94)  ABP: --  ABP(mean): --  RR: 19 (18 Feb 2022 08:00) (16 - 19)  SpO2: 98% (18 Feb 2022 08:00) (97% - 99%)      CONSTITUTIONAL:  Well nourished.   NAD    ENT:   Airway patent,   Mouth with normal mucosa.   No thrush      CARDIAC:   Normal rate,   Regular rhythm.    No edema      Vascular:   normal systolic impulse  no bruits    RESPIRATORY:   No wheezing  Bilateral BS   Not tachypneic,  No use of accessory muscles    GASTROINTESTINAL:  Abdomen soft,   Non-tender,   No guarding,   + BS      NEUROLOGICAL:   Alert and awake  No motor deficits    SKIN:   Skin normal color for race,   No evidence of rash.        02-17-22 @ 07:01  -  02-18-22 @ 07:00  --------------------------------------------------------  IN:    sodium chloride 0.9%: 60 mL  Total IN: 60 mL    OUT:    Voided (mL): 500 mL  Total OUT: 500 mL    Total NET: -440 mL      02-18-22 @ 07:01  -  02-18-22 @ 08:52  --------------------------------------------------------  IN:    sodium chloride 0.9%: 60 mL  Total IN: 60 mL    OUT:  Total OUT: 0 mL    Total NET: 60 mL          LABS:                          9.3    5.05  )-----------( 152      ( 18 Feb 2022 05:36 )             27.1                                               02-18    140  |  108  |  7<L>  ----------------------------<  171<H>  3.7   |  20  |  <0.5<L>    Ca    7.8<L>      18 Feb 2022 05:36    TPro  4.7<L>  /  Alb  3.4<L>  /  TBili  0.7  /  DBili  x   /  AST  11  /  ALT  11  /  AlkPhos  31  02-18      PT/INR - ( 17 Feb 2022 16:31 )   PT: 11.60 sec;   INR: 1.01 ratio         PTT - ( 17 Feb 2022 16:31 )  PTT:25.1 sec                                       Urinalysis Basic - ( 18 Feb 2022 02:20 )    Color: Yellow / Appearance: Clear / SG: >1.050 / pH: x  Gluc: x / Ketone: Trace  / Bili: Negative / Urobili: 3 mg/dL   Blood: x / Protein: Trace / Nitrite: Negative   Leuk Esterase: Negative / RBC: x / WBC x   Sq Epi: x / Non Sq Epi: x / Bacteria: x                                                  LIVER FUNCTIONS - ( 18 Feb 2022 05:36 )  Alb: 3.4 g/dL / Pro: 4.7 g/dL / ALK PHOS: 31 U/L / ALT: 11 U/L / AST: 11 U/L / GGT: x                                                                                             < from: Xray Chest 1 View-PORTABLE IMMEDIATE (Xray Chest 1 View-PORTABLE IMMEDIATE .) (02.18.22 @ 01:43) >    Question of hazy left basilar opacity. Consider follow-up.    < end of copied text >    < from: CT Angio Abdomen and Pelvis w/ IV Cont (02.17.22 @ 21:02) >  Hepatomegaly. Hepatic steatosis.    No evidence of active GI bleeding.    There is an eccentric 2.1 x 2.4 cm soft tissue density along the lateral   aspect of the proximal sigmoid colon(606/132). Several diverticula noted   in the area. There is mild wall thickening and mild pericolonic   stranding. Findings are consistent with diverticulitis. Part of the soft   tissue density adjacent to the sigmoid colon may represent the left ovary   (if it wasn't removed at the time of the hysterectomy). Colonoscopy may   be useful to further evaluate this region to rule out an underlying   colonic mass.    < end of copied text >      MEDICATIONS  (STANDING):  chlorhexidine 4% Liquid 1 Application(s) Topical <User Schedule>  ciprofloxacin   IVPB 400 milliGRAM(s) IV Intermittent every 12 hours  dextrose 40% Gel 15 Gram(s) Oral once  dextrose 5%. 1000 milliLiter(s) (50 mL/Hr) IV Continuous <Continuous>  dextrose 5%. 1000 milliLiter(s) (100 mL/Hr) IV Continuous <Continuous>  dextrose 50% Injectable 25 Gram(s) IV Push once  dextrose 50% Injectable 12.5 Gram(s) IV Push once  dextrose 50% Injectable 25 Gram(s) IV Push once  glucagon  Injectable 1 milliGRAM(s) IntraMuscular once  insulin glargine Injectable (LANTUS) 15 Unit(s) SubCutaneous at bedtime  insulin lispro (ADMELOG) corrective regimen sliding scale   SubCutaneous three times a day before meals  metroNIDAZOLE  IVPB 500 milliGRAM(s) IV Intermittent every 8 hours  pantoprazole  Injectable 40 milliGRAM(s) IV Push every 12 hours  sodium chloride 0.9%. 1000 milliLiter(s) (60 mL/Hr) IV Continuous <Continuous>    MEDICATIONS  (PRN):

## 2022-02-19 LAB
A1C WITH ESTIMATED AVERAGE GLUCOSE RESULT: 6.5 % — HIGH (ref 4–5.6)
ALBUMIN SERPL ELPH-MCNC: 3.8 G/DL — SIGNIFICANT CHANGE UP (ref 3.5–5.2)
ALP SERPL-CCNC: 35 U/L — SIGNIFICANT CHANGE UP (ref 30–115)
ALT FLD-CCNC: 11 U/L — SIGNIFICANT CHANGE UP (ref 0–41)
ANION GAP SERPL CALC-SCNC: 15 MMOL/L — HIGH (ref 7–14)
AST SERPL-CCNC: 13 U/L — SIGNIFICANT CHANGE UP (ref 0–41)
BASOPHILS # BLD AUTO: 0.03 K/UL — SIGNIFICANT CHANGE UP (ref 0–0.2)
BASOPHILS NFR BLD AUTO: 0.7 % — SIGNIFICANT CHANGE UP (ref 0–1)
BILIRUB SERPL-MCNC: 0.2 MG/DL — SIGNIFICANT CHANGE UP (ref 0.2–1.2)
BUN SERPL-MCNC: 6 MG/DL — LOW (ref 10–20)
CALCIUM SERPL-MCNC: 8.4 MG/DL — LOW (ref 8.5–10.1)
CHLORIDE SERPL-SCNC: 108 MMOL/L — SIGNIFICANT CHANGE UP (ref 98–110)
CO2 SERPL-SCNC: 19 MMOL/L — SIGNIFICANT CHANGE UP (ref 17–32)
CREAT SERPL-MCNC: <0.5 MG/DL — LOW (ref 0.7–1.5)
D DIMER BLD IA.RAPID-MCNC: 322 NG/ML DDU — HIGH (ref 0–230)
EOSINOPHIL # BLD AUTO: 0.17 K/UL — SIGNIFICANT CHANGE UP (ref 0–0.7)
EOSINOPHIL NFR BLD AUTO: 3.9 % — SIGNIFICANT CHANGE UP (ref 0–8)
ESTIMATED AVERAGE GLUCOSE: 140 MG/DL — HIGH (ref 68–114)
GLUCOSE BLDC GLUCOMTR-MCNC: 121 MG/DL — HIGH (ref 70–99)
GLUCOSE BLDC GLUCOMTR-MCNC: 124 MG/DL — HIGH (ref 70–99)
GLUCOSE BLDC GLUCOMTR-MCNC: 127 MG/DL — HIGH (ref 70–99)
GLUCOSE BLDC GLUCOMTR-MCNC: 221 MG/DL — HIGH (ref 70–99)
GLUCOSE SERPL-MCNC: 131 MG/DL — HIGH (ref 70–99)
HCT VFR BLD CALC: 26.8 % — LOW (ref 37–47)
HCT VFR BLD CALC: 30.5 % — LOW (ref 37–47)
HGB BLD-MCNC: 10.1 G/DL — LOW (ref 12–16)
HGB BLD-MCNC: 9.1 G/DL — LOW (ref 12–16)
IMM GRANULOCYTES NFR BLD AUTO: 0.7 % — HIGH (ref 0.1–0.3)
LYMPHOCYTES # BLD AUTO: 1.72 K/UL — SIGNIFICANT CHANGE UP (ref 1.2–3.4)
LYMPHOCYTES # BLD AUTO: 39.3 % — SIGNIFICANT CHANGE UP (ref 20.5–51.1)
MAGNESIUM SERPL-MCNC: 2.2 MG/DL — SIGNIFICANT CHANGE UP (ref 1.8–2.4)
MCHC RBC-ENTMCNC: 32.9 PG — HIGH (ref 27–31)
MCHC RBC-ENTMCNC: 33.1 G/DL — SIGNIFICANT CHANGE UP (ref 32–37)
MCHC RBC-ENTMCNC: 33.3 PG — HIGH (ref 27–31)
MCHC RBC-ENTMCNC: 34 G/DL — SIGNIFICANT CHANGE UP (ref 32–37)
MCV RBC AUTO: 98.2 FL — SIGNIFICANT CHANGE UP (ref 81–99)
MCV RBC AUTO: 99.3 FL — HIGH (ref 81–99)
MONOCYTES # BLD AUTO: 0.44 K/UL — SIGNIFICANT CHANGE UP (ref 0.1–0.6)
MONOCYTES NFR BLD AUTO: 10 % — HIGH (ref 1.7–9.3)
NEUTROPHILS # BLD AUTO: 1.99 K/UL — SIGNIFICANT CHANGE UP (ref 1.4–6.5)
NEUTROPHILS NFR BLD AUTO: 45.4 % — SIGNIFICANT CHANGE UP (ref 42.2–75.2)
NRBC # BLD: 0 /100 WBCS — SIGNIFICANT CHANGE UP (ref 0–0)
NRBC # BLD: 0 /100 WBCS — SIGNIFICANT CHANGE UP (ref 0–0)
PLATELET # BLD AUTO: 191 K/UL — SIGNIFICANT CHANGE UP (ref 130–400)
PLATELET # BLD AUTO: 191 K/UL — SIGNIFICANT CHANGE UP (ref 130–400)
POTASSIUM SERPL-MCNC: 3.9 MMOL/L — SIGNIFICANT CHANGE UP (ref 3.5–5)
POTASSIUM SERPL-SCNC: 3.9 MMOL/L — SIGNIFICANT CHANGE UP (ref 3.5–5)
PROCALCITONIN SERPL-MCNC: 0.09 NG/ML — SIGNIFICANT CHANGE UP (ref 0.02–0.1)
PROT SERPL-MCNC: 5.8 G/DL — LOW (ref 6–8)
RBC # BLD: 2.73 M/UL — LOW (ref 4.2–5.4)
RBC # BLD: 3.07 M/UL — LOW (ref 4.2–5.4)
RBC # FLD: 12.9 % — SIGNIFICANT CHANGE UP (ref 11.5–14.5)
RBC # FLD: 13.2 % — SIGNIFICANT CHANGE UP (ref 11.5–14.5)
SODIUM SERPL-SCNC: 142 MMOL/L — SIGNIFICANT CHANGE UP (ref 135–146)
WBC # BLD: 4.38 K/UL — LOW (ref 4.8–10.8)
WBC # BLD: 4.45 K/UL — LOW (ref 4.8–10.8)
WBC # FLD AUTO: 4.38 K/UL — LOW (ref 4.8–10.8)
WBC # FLD AUTO: 4.45 K/UL — LOW (ref 4.8–10.8)

## 2022-02-19 PROCEDURE — 99233 SBSQ HOSP IP/OBS HIGH 50: CPT

## 2022-02-19 PROCEDURE — 99232 SBSQ HOSP IP/OBS MODERATE 35: CPT

## 2022-02-19 RX ORDER — CEFEPIME 1 G/1
2000 INJECTION, POWDER, FOR SOLUTION INTRAMUSCULAR; INTRAVENOUS ONCE
Refills: 0 | Status: COMPLETED | OUTPATIENT
Start: 2022-02-19 | End: 2022-02-19

## 2022-02-19 RX ORDER — ACETAMINOPHEN 500 MG
650 TABLET ORAL EVERY 6 HOURS
Refills: 0 | Status: DISCONTINUED | OUTPATIENT
Start: 2022-02-19 | End: 2022-02-20

## 2022-02-19 RX ORDER — CEFTRIAXONE 500 MG/1
1000 INJECTION, POWDER, FOR SOLUTION INTRAMUSCULAR; INTRAVENOUS EVERY 24 HOURS
Refills: 0 | Status: DISCONTINUED | OUTPATIENT
Start: 2022-02-20 | End: 2022-02-20

## 2022-02-19 RX ORDER — CEFEPIME 1 G/1
2000 INJECTION, POWDER, FOR SOLUTION INTRAMUSCULAR; INTRAVENOUS EVERY 8 HOURS
Refills: 0 | Status: DISCONTINUED | OUTPATIENT
Start: 2022-02-19 | End: 2022-02-19

## 2022-02-19 RX ORDER — CEFEPIME 1 G/1
INJECTION, POWDER, FOR SOLUTION INTRAMUSCULAR; INTRAVENOUS
Refills: 0 | Status: DISCONTINUED | OUTPATIENT
Start: 2022-02-19 | End: 2022-02-19

## 2022-02-19 RX ORDER — PANTOPRAZOLE SODIUM 20 MG/1
40 TABLET, DELAYED RELEASE ORAL
Refills: 0 | Status: DISCONTINUED | OUTPATIENT
Start: 2022-02-19 | End: 2022-02-20

## 2022-02-19 RX ADMIN — Medication 650 MILLIGRAM(S): at 09:24

## 2022-02-19 RX ADMIN — INSULIN GLARGINE 15 UNIT(S): 100 INJECTION, SOLUTION SUBCUTANEOUS at 21:30

## 2022-02-19 RX ADMIN — Medication 650 MILLIGRAM(S): at 06:04

## 2022-02-19 RX ADMIN — Medication 100 MILLIGRAM(S): at 06:31

## 2022-02-19 RX ADMIN — Medication 100 MILLIGRAM(S): at 13:13

## 2022-02-19 RX ADMIN — CEFEPIME 100 MILLIGRAM(S): 1 INJECTION, POWDER, FOR SOLUTION INTRAMUSCULAR; INTRAVENOUS at 08:49

## 2022-02-19 RX ADMIN — Medication 650 MILLIGRAM(S): at 08:56

## 2022-02-19 RX ADMIN — Medication 100 MILLIGRAM(S): at 21:29

## 2022-02-19 RX ADMIN — Medication 650 MILLIGRAM(S): at 00:20

## 2022-02-19 RX ADMIN — PANTOPRAZOLE SODIUM 40 MILLIGRAM(S): 20 TABLET, DELAYED RELEASE ORAL at 06:31

## 2022-02-19 NOTE — PROGRESS NOTE ADULT - ASSESSMENT
IMPRESSION:    LGIB   Diverticulosis with probable diverticulitis  Soft tissue mass along sigmoid  Asymptomatic COVID-19 + (vaccinated w/ Moderna x 3)  H/O LISA  H/O DM    PLAN:    CNS: Avoid CNS depressants    HEENT: Oral care    PULMONARY:  HOB @ 45 degrees. Aspiration Precautions . Incentive spirometry    CARDIOVASCULAR:  D/C IVF.  Avoid overload.       GI: GI eval appreciated. Protonix PO Q24.  Clear feeds as per GI.     RENAL:  Follow up lytes.  Correct as needed    INFECTIOUS DISEASE: Follow up cultures. C/W Cefepime and flagyl. Procalcitonin    HEMATOLOGICAL:  DVT prophylaxis- SCDs for now.  Monitor cbc and coags.     ENDOCRINE:  Follow up FS.  Insulin protocol if needed.     MUSCULOSKELETAL: OOB to chair    [place]: No  Lines: Peripheral IV  Code status: Full code            
62 yo female patient with PMHX of HTN, DM, DLD presents for hematochezia. Pt found to have acute diverticulitis and sigmoid mass.    #Acute diverticulitis  #Sigmoid Mass  CT Abdomen/pelvis: Eccentric 2.1x2.4cm soft tissue density along the lateral aspect of the proximal sigmoid colon; Mild wall thickening and mild pericolonic stranding. Findings are conssitent with diverticulitis  Transfused overnight on 02/17 1U PRBC, hg has been stable  - Monitor CBC  - Cont cefepime + flagyl  - ID eval for outpatient abx recommendations  - Plan for colonoscopy in 6 weeks after resolution of diverticulitis  - Advanced diet to soft  - DC planning in 24h if hemoglobin remains stable    #HTN/HLD  - Holding home lisinopril   - Resume fenofibrate on dc    #DM2  - Take janumet XR 50-1000mg at home  - Lantus + ISS while inaptient    #Progress Note Handoff  Pending (specify): Monitoring Hg  Family discussion: d/w pt regarding possible DC tomorrow on oral abx if hg is stable  Disposition: Home

## 2022-02-19 NOTE — CONSULT NOTE ADULT - ASSESSMENT
ASSESSMENT  60 yo female patient with PMHX of HTN, DM, DLD presents for hematochezia    IMPRESSION  #Hematochezia  #Soft Tissue density along sigmoid colon  #Sigmoid diverticulitis   #COVID-19, asymptomatic, s/p vaccine + booster  #Abx allergy: NKDA    RECOMMENDATIONS  - change cefepime to ceftriaxone 1g daily   - continue flagyl 500 mg TID   - trend WBC and H/H   - eventually finish with PO course for diverticulitis   - outpatient colonscopy after treating diverticulitis     Please call or message on [employer] Teams if with any questions.  Spectra [phone]       ASSESSMENT  62 yo female patient with PMHX of HTN, DM, DLD presents for hematochezia    IMPRESSION  #Hematochezia  #Soft Tissue density along sigmoid colon  #Sigmoid diverticulitis   #COVID-19, asymptomatic, s/p vaccine + booster  #Abx allergy: NKDA    RECOMMENDATIONS  - change cefepime to ceftriaxone 1g daily   - continue flagyl 500 mg TID   - trend WBC and H/H   - eventually finish with PO course for diverticulitis -- can change to PO vantin 200 mg BID and PO flagyl 500 mg TID (2/17-2/26)  - outpatient colonscopy after treating diverticulitis     Please call or message on [employer] Teams if with any questions.  Spectra [phone]

## 2022-02-19 NOTE — CONSULT NOTE ADULT - SUBJECTIVE AND OBJECTIVE BOX
BRYANNA CHIU  61y, Female  Allergy: No Known Allergies      CHIEF COMPLAINT: hematochezia (19 Feb 2022 12:38)      LOS  2d    HPI:  62 yo female patient with PMHX of HTN, DM, DLD presents for hematochezia. Patient started having bloody bowel movements today, first episode around noon. She had a total of 8 episodes before coming to the ED for eval. She reports feeling dizzy, pale, diaphoretic and weak prior to presentation, her symptoms have resolved at the time of encounter. She also endorses abdominal pain mainly in the lower quadrants, moderate in intensity and non radiating. No previous history of GI bleeding, PUD or other GI problems. She denied fevers, chills, nausea or vomiting, urinary or other associated symptoms. She tested positive for COVID in the ED but denied having any symptoms. She is fully vaccinated and received the booster.    In the ED VS were within normal.  initial hgb was 11, repeat level 6 hours after was 9.  CTA was negative for active GI bleed but did showed diverticulitis.  She received IVF, abx and 1 unit of PRBC. (18 Feb 2022 00:08)      INFECTIOUS DISEASE HISTORY:  History as above.   CT Abd/pelvis 2/17 with hepatomegaly, soft tissue density along proximal sigmoid colon as well as pericolonic stranding.   WBC Count: 12.80 K/uL (02.17.22 @ 16:31) on admission.   Started on ceftriaxone --> cefepime /flagyl      PAST MEDICAL & SURGICAL HISTORY:  HTN (hypertension)    DM (diabetes mellitus)    Dyslipidemia    H/O: hysterectomy        FAMILY HISTORY      SOCIAL HISTORY  Social History:        ROS  General: Denies rigors, nightsweats  HEENT: Denies headache, rhinorrhea, sore throat, eye pain  CV: Denies CP, palpitations  PULM: Denies wheezing, hemoptysis  GI: Denies hematemesis, hematochezia, melena  : Denies discharge, hematuria  MSK: Denies arthralgias, myalgias  SKIN: Denies rash, lesions  NEURO: Denies paresthesias, weakness  PSYCH: Denies depression, anxiety    VITALS:  T(F): 97.8, Max: 98.9 (02-18-22 @ 15:42)  HR: 77  BP: 130/60  RR: 18Vital Signs Last 24 Hrs  T(C): 36.6 (19 Feb 2022 08:47), Max: 37.2 (18 Feb 2022 15:42)  T(F): 97.8 (19 Feb 2022 08:47), Max: 98.9 (18 Feb 2022 15:42)  HR: 77 (19 Feb 2022 08:47) (72 - 77)  BP: 130/60 (19 Feb 2022 08:47) (120/60 - 130/60)  BP(mean): --  RR: 18 (19 Feb 2022 08:47) (16 - 19)  SpO2: 97% (19 Feb 2022 08:47) (97% - 99%)    PHYSICAL EXAM:  Gen: NAD, resting in bed  HEENT: Normocephalic, atraumatic  Neck: supple, no lymphadenopathy  CV: Regular rate & regular rhythm  Lungs: decreased BS at bases, no fremitus  Abdomen: Soft, BS present  Ext: Warm, well perfused  Neuro: non focal, awake  Skin: no rash, no erythema  Lines: no phlebitis    TESTS & MEASUREMENTS:                        10.1   4.38  )-----------( 191      ( 19 Feb 2022 04:30 )             30.5     02-19    142  |  108  |  6<L>  ----------------------------<  131<H>  3.9   |  19  |  <0.5<L>    Ca    8.4<L>      19 Feb 2022 04:30  Mg     2.2     02-19    TPro  5.8<L>  /  Alb  3.8  /  TBili  0.2  /  DBili  x   /  AST  13  /  ALT  11  /  AlkPhos  35  02-19    eGFR if Non African American: 123 mL/min/1.73M2 (02-19-22 @ 04:30)  eGFR if African American: 142 mL/min/1.73M2 (02-19-22 @ 04:30)    LIVER FUNCTIONS - ( 19 Feb 2022 04:30 )  Alb: 3.8 g/dL / Pro: 5.8 g/dL / ALK PHOS: 35 U/L / ALT: 11 U/L / AST: 13 U/L / GGT: x           Urinalysis Basic - ( 18 Feb 2022 02:20 )    Color: Yellow / Appearance: Clear / SG: >1.050 / pH: x  Gluc: x / Ketone: Trace  / Bili: Negative / Urobili: 3 mg/dL   Blood: x / Protein: Trace / Nitrite: Negative   Leuk Esterase: Negative / RBC: x / WBC x   Sq Epi: x / Non Sq Epi: x / Bacteria: x          Lactate, Blood: 2.0 mmol/L (02-17-22 @ 16:31)      INFECTIOUS DISEASES TESTING  COVID-19 PCR: Detected (02-17-22 @ 16:31)      RADIOLOGY & ADDITIONAL TESTS:  I have personally reviewed the last Chest xray  CXR      CT      CARDIOLOGY TESTING  12 Lead ECG:   Ventricular Rate 81 BPM    Atrial Rate 81 BPM    P-R Interval 146 ms    QRS Duration 76 ms    Q-T Interval 414 ms    QTC Calculation(Bazett) 480 ms    P Axis 21 degrees    R Axis -1 degrees    T Axis 28 degrees    Diagnosis Line Normal sinus rhythm  Voltage criteria for left ventricular hypertrophy  Abnormal ECG    Confirmed by HILDA GOLDMAN MD ([id number]) on 2/18/2022 7:00:09 AM (02-17-22 @ 17:00)      MEDICATIONS  cefepime   IVPB     cefepime   IVPB 2000 IV Intermittent every 8 hours  chlorhexidine 4% Liquid 1 Topical <User Schedule>  dextrose 40% Gel 15 Oral once  dextrose 5%. 1000 IV Continuous <Continuous>  dextrose 5%. 1000 IV Continuous <Continuous>  dextrose 50% Injectable 25 IV Push once  dextrose 50% Injectable 12.5 IV Push once  dextrose 50% Injectable 25 IV Push once  glucagon  Injectable 1 IntraMuscular once  influenza   Vaccine 0.5 IntraMuscular once  insulin glargine Injectable (LANTUS) 15 SubCutaneous at bedtime  insulin lispro (ADMELOG) corrective regimen sliding scale  SubCutaneous three times a day before meals  metroNIDAZOLE  IVPB 500 IV Intermittent every 8 hours  pantoprazole  Injectable 40 IV Push every 12 hours      Weight  Weight (kg): 83.5 (02-17-22 @ 15:24)    ANTIBIOTICS:  cefepime   IVPB      cefepime   IVPB 2000 milliGRAM(s) IV Intermittent every 8 hours  metroNIDAZOLE  IVPB 500 milliGRAM(s) IV Intermittent every 8 hours      ALLERGIES:  No Known Allergies   BRYANNA CHIU  61y, Female  Allergy: No Known Allergies      CHIEF COMPLAINT: hematochezia (19 Feb 2022 12:38)      LOS  2d    HPI:  62 yo female patient with PMHX of HTN, DM, DLD presents for hematochezia. Patient started having bloody bowel movements today, first episode around noon. She had a total of 8 episodes before coming to the ED for eval. She reports feeling dizzy, pale, diaphoretic and weak prior to presentation, her symptoms have resolved at the time of encounter. She also endorses abdominal pain mainly in the lower quadrants, moderate in intensity and non radiating. No previous history of GI bleeding, PUD or other GI problems. She denied fevers, chills, nausea or vomiting, urinary or other associated symptoms. She tested positive for COVID in the ED but denied having any symptoms. She is fully vaccinated and received the booster.    In the ED VS were within normal.  initial hgb was 11, repeat level 6 hours after was 9.  CTA was negative for active GI bleed but did showed diverticulitis.  She received IVF, abx and 1 unit of PRBC. (18 Feb 2022 00:08)      INFECTIOUS DISEASE HISTORY:  History as above.   CT Abd/pelvis 2/17 with hepatomegaly, soft tissue density along proximal sigmoid colon as well as pericolonic stranding.   WBC Count: 12.80 K/uL (02.17.22 @ 16:31) on admission.   Started on ceftriaxone --> cefepime /flagyl      PAST MEDICAL & SURGICAL HISTORY:  HTN (hypertension)    DM (diabetes mellitus)    Dyslipidemia    H/O: hysterectomy        FAMILY HISTORY  Family Hx reviewed and non-contributory     SOCIAL HISTORY  Social History:        ROS  General: Denies rigors, nightsweats  HEENT: Denies headache, rhinorrhea, sore throat, eye pain  CV: Denies CP, palpitations  PULM: Denies wheezing, hemoptysis  GI: Denies hematemesis, hematochezia, melena  : Denies discharge, hematuria  MSK: Denies arthralgias, myalgias  SKIN: Denies rash, lesions  NEURO: Denies paresthesias, weakness  PSYCH: Denies depression, anxiety    VITALS:  T(F): 97.8, Max: 98.9 (02-18-22 @ 15:42)  HR: 77  BP: 130/60  RR: 18Vital Signs Last 24 Hrs  T(C): 36.6 (19 Feb 2022 08:47), Max: 37.2 (18 Feb 2022 15:42)  T(F): 97.8 (19 Feb 2022 08:47), Max: 98.9 (18 Feb 2022 15:42)  HR: 77 (19 Feb 2022 08:47) (72 - 77)  BP: 130/60 (19 Feb 2022 08:47) (120/60 - 130/60)  BP(mean): --  RR: 18 (19 Feb 2022 08:47) (16 - 19)  SpO2: 97% (19 Feb 2022 08:47) (97% - 99%)    PHYSICAL EXAM:  Gen: NAD, resting in bed  HEENT: Normocephalic, atraumatic  Neck: supple, no lymphadenopathy  CV: Regular rate & regular rhythm  Lungs: decreased BS at bases, no fremitus  Abdomen: Soft, BS present  Ext: Warm, well perfused  Neuro: non focal, awake  Skin: no rash, no erythema  Lines: no phlebitis    TESTS & MEASUREMENTS:                        10.1   4.38  )-----------( 191      ( 19 Feb 2022 04:30 )             30.5     02-19    142  |  108  |  6<L>  ----------------------------<  131<H>  3.9   |  19  |  <0.5<L>    Ca    8.4<L>      19 Feb 2022 04:30  Mg     2.2     02-19    TPro  5.8<L>  /  Alb  3.8  /  TBili  0.2  /  DBili  x   /  AST  13  /  ALT  11  /  AlkPhos  35  02-19    eGFR if Non African American: 123 mL/min/1.73M2 (02-19-22 @ 04:30)  eGFR if African American: 142 mL/min/1.73M2 (02-19-22 @ 04:30)    LIVER FUNCTIONS - ( 19 Feb 2022 04:30 )  Alb: 3.8 g/dL / Pro: 5.8 g/dL / ALK PHOS: 35 U/L / ALT: 11 U/L / AST: 13 U/L / GGT: x           Urinalysis Basic - ( 18 Feb 2022 02:20 )    Color: Yellow / Appearance: Clear / SG: >1.050 / pH: x  Gluc: x / Ketone: Trace  / Bili: Negative / Urobili: 3 mg/dL   Blood: x / Protein: Trace / Nitrite: Negative   Leuk Esterase: Negative / RBC: x / WBC x   Sq Epi: x / Non Sq Epi: x / Bacteria: x          Lactate, Blood: 2.0 mmol/L (02-17-22 @ 16:31)      INFECTIOUS DISEASES TESTING  COVID-19 PCR: Detected (02-17-22 @ 16:31)      RADIOLOGY & ADDITIONAL TESTS:  I have personally reviewed the last Chest xray  CXR      CT      CARDIOLOGY TESTING  12 Lead ECG:   Ventricular Rate 81 BPM    Atrial Rate 81 BPM    P-R Interval 146 ms    QRS Duration 76 ms    Q-T Interval 414 ms    QTC Calculation(Bazett) 480 ms    P Axis 21 degrees    R Axis -1 degrees    T Axis 28 degrees    Diagnosis Line Normal sinus rhythm  Voltage criteria for left ventricular hypertrophy  Abnormal ECG    Confirmed by HILDA GOLDMAN MD ([id number]) on 2/18/2022 7:00:09 AM (02-17-22 @ 17:00)      MEDICATIONS  cefepime   IVPB     cefepime   IVPB 2000 IV Intermittent every 8 hours  chlorhexidine 4% Liquid 1 Topical <User Schedule>  dextrose 40% Gel 15 Oral once  dextrose 5%. 1000 IV Continuous <Continuous>  dextrose 5%. 1000 IV Continuous <Continuous>  dextrose 50% Injectable 25 IV Push once  dextrose 50% Injectable 12.5 IV Push once  dextrose 50% Injectable 25 IV Push once  glucagon  Injectable 1 IntraMuscular once  influenza   Vaccine 0.5 IntraMuscular once  insulin glargine Injectable (LANTUS) 15 SubCutaneous at bedtime  insulin lispro (ADMELOG) corrective regimen sliding scale  SubCutaneous three times a day before meals  metroNIDAZOLE  IVPB 500 IV Intermittent every 8 hours  pantoprazole  Injectable 40 IV Push every 12 hours      Weight  Weight (kg): 83.5 (02-17-22 @ 15:24)    ANTIBIOTICS:  cefepime   IVPB      cefepime   IVPB 2000 milliGRAM(s) IV Intermittent every 8 hours  metroNIDAZOLE  IVPB 500 milliGRAM(s) IV Intermittent every 8 hours      ALLERGIES:  No Known Allergies

## 2022-02-19 NOTE — PROGRESS NOTE ADULT - SUBJECTIVE AND OBJECTIVE BOX
Over Night Events:  events noted, afebrile no bloody BM, GI reviewed    PHYSICAL EXAM    ICU Vital Signs Last 24 Hrs  T(C): 36.7 (19 Feb 2022 06:08), Max: 37.2 (18 Feb 2022 15:42)  T(F): 98.1 (19 Feb 2022 06:08), Max: 98.9 (18 Feb 2022 15:42)  HR: 72 (19 Feb 2022 06:08) (72 - 78)  BP: 120/60 (19 Feb 2022 06:08) (120/60 - 144/65)  BP(mean): 93 (18 Feb 2022 10:00) (85 - 93)  RR: 16 (19 Feb 2022 06:08) (16 - 19)  SpO2: 97% (19 Feb 2022 06:08) (97% - 99%)      General: comfortbale  HEENT: ASHOK             Lymph Nodes: No cervical LN   Lungs: Bilateral BS  Cardiovascular: Regular   Abdomen: Soft, Positive BS  Extremities: No clubbing   Skin: Warm  Neurological: Non focal       02-18-22 @ 07:01  -  02-19-22 @ 07:00  --------------------------------------------------------  IN:    sodium chloride 0.9%: 720 mL  Total IN: 720 mL    OUT:  Total OUT: 0 mL    Total NET: 720 mL          LABS:                          10.1   4.38  )-----------( 191      ( 19 Feb 2022 04:30 )             30.5                                               02-18    140  |  108  |  7<L>  ----------------------------<  171<H>  3.7   |  20  |  <0.5<L>    Ca    7.8<L>      18 Feb 2022 05:36    TPro  4.7<L>  /  Alb  3.4<L>  /  TBili  0.7  /  DBili  x   /  AST  11  /  ALT  11  /  AlkPhos  31  02-18      PT/INR - ( 17 Feb 2022 16:31 )   PT: 11.60 sec;   INR: 1.01 ratio         PTT - ( 17 Feb 2022 16:31 )  PTT:25.1 sec                                       Urinalysis Basic - ( 18 Feb 2022 02:20 )    Color: Yellow / Appearance: Clear / SG: >1.050 / pH: x  Gluc: x / Ketone: Trace  / Bili: Negative / Urobili: 3 mg/dL   Blood: x / Protein: Trace / Nitrite: Negative   Leuk Esterase: Negative / RBC: x / WBC x   Sq Epi: x / Non Sq Epi: x / Bacteria: x                                                  LIVER FUNCTIONS - ( 18 Feb 2022 05:36 )  Alb: 3.4 g/dL / Pro: 4.7 g/dL / ALK PHOS: 31 U/L / ALT: 11 U/L / AST: 11 U/L / GGT: x                                                                                                                                       MEDICATIONS  (STANDING):  cefTRIAXone   IVPB 1000 milliGRAM(s) IV Intermittent every 24 hours  chlorhexidine 4% Liquid 1 Application(s) Topical <User Schedule>  dextrose 40% Gel 15 Gram(s) Oral once  dextrose 5%. 1000 milliLiter(s) (50 mL/Hr) IV Continuous <Continuous>  dextrose 5%. 1000 milliLiter(s) (100 mL/Hr) IV Continuous <Continuous>  dextrose 50% Injectable 25 Gram(s) IV Push once  dextrose 50% Injectable 12.5 Gram(s) IV Push once  dextrose 50% Injectable 25 Gram(s) IV Push once  glucagon  Injectable 1 milliGRAM(s) IntraMuscular once  influenza   Vaccine 0.5 milliLiter(s) IntraMuscular once  insulin glargine Injectable (LANTUS) 15 Unit(s) SubCutaneous at bedtime  insulin lispro (ADMELOG) corrective regimen sliding scale   SubCutaneous three times a day before meals  metroNIDAZOLE  IVPB 500 milliGRAM(s) IV Intermittent every 8 hours  pantoprazole  Injectable 40 milliGRAM(s) IV Push every 12 hours  sodium chloride 0.9%. 1000 milliLiter(s) (60 mL/Hr) IV Continuous <Continuous>    MEDICATIONS  (PRN):  acetaminophen     Tablet .. 650 milliGRAM(s) Oral every 6 hours PRN Temp greater or equal to 38C (100.4F), Mild Pain (1 - 3)      cxr reviewed

## 2022-02-19 NOTE — PROGRESS NOTE ADULT - SUBJECTIVE AND OBJECTIVE BOX
BRYANNA CHIU  61y, Female  Allergy: No Known Allergies    Hospital Day: 2d    Patient seen and examined. No acute events overnight    PMH/PSH:  PAST MEDICAL & SURGICAL HISTORY:  HTN (hypertension)    DM (diabetes mellitus)    Dyslipidemia    H/O: hysterectomy    VITALS:  T(F): 97.8 (02-19-22 @ 08:47), Max: 98.9 (02-18-22 @ 15:42)  HR: 77 (02-19-22 @ 08:47)  BP: 130/60 (02-19-22 @ 08:47) (120/60 - 130/60)  RR: 18 (02-19-22 @ 08:47)  SpO2: 97% (02-19-22 @ 08:47)    TESTS & MEASUREMENTS:  Weight (Kg): 83.5 (02-17-22 @ 15:24)    02-17-22 @ 07:01  -  02-18-22 @ 07:00  --------------------------------------------------------  IN: 60 mL / OUT: 500 mL / NET: -440 mL    02-18-22 @ 07:01  -  02-19-22 @ 07:00  --------------------------------------------------------  IN: 720 mL / OUT: 0 mL / NET: 720 mL                        10.1   4.38  )-----------( 191      ( 19 Feb 2022 04:30 )             30.5     PT/INR - ( 17 Feb 2022 16:31 )   PT: 11.60 sec;   INR: 1.01 ratio       PTT - ( 17 Feb 2022 16:31 )  PTT:25.1 sec  02-19    142  |  108  |  6<L>  ----------------------------<  131<H>  3.9   |  19  |  <0.5<L>    Ca    8.4<L>      19 Feb 2022 04:30  Mg     2.2     02-19    TPro  5.8<L>  /  Alb  3.8  /  TBili  0.2  /  DBili  x   /  AST  13  /  ALT  11  /  AlkPhos  35  02-19    LIVER FUNCTIONS - ( 19 Feb 2022 04:30 )  Alb: 3.8 g/dL / Pro: 5.8 g/dL / ALK PHOS: 35 U/L / ALT: 11 U/L / AST: 13 U/L / GGT: x           Urinalysis Basic - ( 18 Feb 2022 02:20 )    Color: Yellow / Appearance: Clear / SG: >1.050 / pH: x  Gluc: x / Ketone: Trace  / Bili: Negative / Urobili: 3 mg/dL   Blood: x / Protein: Trace / Nitrite: Negative   Leuk Esterase: Negative / RBC: x / WBC x   Sq Epi: x / Non Sq Epi: x / Bacteria: x    RECENT DIAGNOSTIC ORDERS:  Magnesium, Serum: AM Sched. Collection: 20-Feb-2022 04:30 (02-19-22 @ 11:18)  Magnesium, Serum: Repeat From: 19-Feb-2022 11:18 To: 26-Feb-2022 04:30, Every 1 day(s) (02-19-22 @ 11:18)  Comprehensive Metabolic Panel: AM Sched. Collection: 20-Feb-2022 04:30 (02-19-22 @ 11:18)  Comprehensive Metabolic Panel: Repeat From: 19-Feb-2022 11:17 To: 26-Feb-2022 04:30, Every 1 day(s) (02-19-22 @ 11:18)  Complete Blood Count + Automated Diff: AM Sched. Collection: 20-Feb-2022 04:30 (02-19-22 @ 11:18)  Complete Blood Count + Automated Diff: Repeat From: 19-Feb-2022 11:17 To: 26-Feb-2022 04:30, Every 1 day(s) (02-19-22 @ 11:18)  Diet, Soft and Bite Sized:   Consistent Carbohydrate No Snacks  Fiber/Residue Restricted (02-19-22 @ 09:40)    MEDICATIONS:  MEDICATIONS  (STANDING):  cefepime   IVPB      cefepime   IVPB 2000 milliGRAM(s) IV Intermittent every 8 hours  chlorhexidine 4% Liquid 1 Application(s) Topical <User Schedule>  dextrose 40% Gel 15 Gram(s) Oral once  dextrose 5%. 1000 milliLiter(s) (50 mL/Hr) IV Continuous <Continuous>  dextrose 5%. 1000 milliLiter(s) (100 mL/Hr) IV Continuous <Continuous>  dextrose 50% Injectable 25 Gram(s) IV Push once  dextrose 50% Injectable 12.5 Gram(s) IV Push once  dextrose 50% Injectable 25 Gram(s) IV Push once  glucagon  Injectable 1 milliGRAM(s) IntraMuscular once  influenza   Vaccine 0.5 milliLiter(s) IntraMuscular once  insulin glargine Injectable (LANTUS) 15 Unit(s) SubCutaneous at bedtime  insulin lispro (ADMELOG) corrective regimen sliding scale   SubCutaneous three times a day before meals  metroNIDAZOLE  IVPB 500 milliGRAM(s) IV Intermittent every 8 hours  pantoprazole  Injectable 40 milliGRAM(s) IV Push every 12 hours    MEDICATIONS  (PRN):  acetaminophen     Tablet .. 650 milliGRAM(s) Oral every 6 hours PRN Temp greater or equal to 38C (100.4F), Mild Pain (1 - 3)    HOME MEDICATIONS:  fenofibrate 145 mg oral tablet (02-18)  Janumet XR 50 mg-1000 mg oral tablet, extended release (02-18)  lisinopril 5 mg oral tablet (02-18)  Premarin 0.625 mg oral tablet (02-18)    REVIEW OF SYSTEMS:  All other review of systems is negative unless indicated above.     PHYSICAL EXAM:  PHYSICAL EXAM:  GENERAL: NAD, well-developed  HEAD:  Atraumatic, Normocephalic  NECK: Supple, No JVD  CHEST/LUNG: Clear to auscultation bilaterally; No wheeze  HEART: Regular rate and rhythm; No murmurs, rubs, or gallops  ABDOMEN: Soft, Nontender, Nondistended; Bowel sounds present  EXTREMITIES:  2+ Peripheral Pulses, No clubbing, cyanosis, or edema  SKIN: No rashes or lesions

## 2022-02-20 ENCOUNTER — TRANSCRIPTION ENCOUNTER (OUTPATIENT)
Age: 62
End: 2022-02-20

## 2022-02-20 VITALS
SYSTOLIC BLOOD PRESSURE: 127 MMHG | DIASTOLIC BLOOD PRESSURE: 68 MMHG | RESPIRATION RATE: 16 BRPM | OXYGEN SATURATION: 97 % | HEART RATE: 74 BPM | TEMPERATURE: 98 F

## 2022-02-20 LAB
ALBUMIN SERPL ELPH-MCNC: 3.5 G/DL — SIGNIFICANT CHANGE UP (ref 3.5–5.2)
ALP SERPL-CCNC: 37 U/L — SIGNIFICANT CHANGE UP (ref 30–115)
ALT FLD-CCNC: 9 U/L — SIGNIFICANT CHANGE UP (ref 0–41)
ANION GAP SERPL CALC-SCNC: 10 MMOL/L — SIGNIFICANT CHANGE UP (ref 7–14)
AST SERPL-CCNC: 11 U/L — SIGNIFICANT CHANGE UP (ref 0–41)
BASOPHILS # BLD AUTO: 0.02 K/UL — SIGNIFICANT CHANGE UP (ref 0–0.2)
BASOPHILS NFR BLD AUTO: 0.5 % — SIGNIFICANT CHANGE UP (ref 0–1)
BILIRUB SERPL-MCNC: <0.2 MG/DL — SIGNIFICANT CHANGE UP (ref 0.2–1.2)
BUN SERPL-MCNC: 7 MG/DL — LOW (ref 10–20)
CALCIUM SERPL-MCNC: 8.6 MG/DL — SIGNIFICANT CHANGE UP (ref 8.5–10.1)
CHLORIDE SERPL-SCNC: 109 MMOL/L — SIGNIFICANT CHANGE UP (ref 98–110)
CO2 SERPL-SCNC: 23 MMOL/L — SIGNIFICANT CHANGE UP (ref 17–32)
CREAT SERPL-MCNC: <0.5 MG/DL — LOW (ref 0.7–1.5)
EOSINOPHIL # BLD AUTO: 0.22 K/UL — SIGNIFICANT CHANGE UP (ref 0–0.7)
EOSINOPHIL NFR BLD AUTO: 5.4 % — SIGNIFICANT CHANGE UP (ref 0–8)
GLUCOSE BLDC GLUCOMTR-MCNC: 139 MG/DL — HIGH (ref 70–99)
GLUCOSE BLDC GLUCOMTR-MCNC: 179 MG/DL — HIGH (ref 70–99)
GLUCOSE SERPL-MCNC: 157 MG/DL — HIGH (ref 70–99)
HCT VFR BLD CALC: 28.7 % — LOW (ref 37–47)
HGB BLD-MCNC: 9.6 G/DL — LOW (ref 12–16)
IMM GRANULOCYTES NFR BLD AUTO: 0.7 % — HIGH (ref 0.1–0.3)
LYMPHOCYTES # BLD AUTO: 1.26 K/UL — SIGNIFICANT CHANGE UP (ref 1.2–3.4)
LYMPHOCYTES # BLD AUTO: 31.1 % — SIGNIFICANT CHANGE UP (ref 20.5–51.1)
MAGNESIUM SERPL-MCNC: 2.3 MG/DL — SIGNIFICANT CHANGE UP (ref 1.8–2.4)
MCHC RBC-ENTMCNC: 32.9 PG — HIGH (ref 27–31)
MCHC RBC-ENTMCNC: 33.4 G/DL — SIGNIFICANT CHANGE UP (ref 32–37)
MCV RBC AUTO: 98.3 FL — SIGNIFICANT CHANGE UP (ref 81–99)
MONOCYTES # BLD AUTO: 0.47 K/UL — SIGNIFICANT CHANGE UP (ref 0.1–0.6)
MONOCYTES NFR BLD AUTO: 11.6 % — HIGH (ref 1.7–9.3)
NEUTROPHILS # BLD AUTO: 2.05 K/UL — SIGNIFICANT CHANGE UP (ref 1.4–6.5)
NEUTROPHILS NFR BLD AUTO: 50.7 % — SIGNIFICANT CHANGE UP (ref 42.2–75.2)
NRBC # BLD: 0 /100 WBCS — SIGNIFICANT CHANGE UP (ref 0–0)
PLATELET # BLD AUTO: 200 K/UL — SIGNIFICANT CHANGE UP (ref 130–400)
POTASSIUM SERPL-MCNC: 3.8 MMOL/L — SIGNIFICANT CHANGE UP (ref 3.5–5)
POTASSIUM SERPL-SCNC: 3.8 MMOL/L — SIGNIFICANT CHANGE UP (ref 3.5–5)
PROT SERPL-MCNC: 5.3 G/DL — LOW (ref 6–8)
RBC # BLD: 2.92 M/UL — LOW (ref 4.2–5.4)
RBC # FLD: 12.7 % — SIGNIFICANT CHANGE UP (ref 11.5–14.5)
SODIUM SERPL-SCNC: 142 MMOL/L — SIGNIFICANT CHANGE UP (ref 135–146)
WBC # BLD: 4.05 K/UL — LOW (ref 4.8–10.8)
WBC # FLD AUTO: 4.05 K/UL — LOW (ref 4.8–10.8)

## 2022-02-20 PROCEDURE — 99238 HOSP IP/OBS DSCHRG MGMT 30/<: CPT

## 2022-02-20 RX ORDER — POLYETHYLENE GLYCOL 3350 17 G/17G
17 POWDER, FOR SOLUTION ORAL DAILY
Refills: 0 | Status: DISCONTINUED | OUTPATIENT
Start: 2022-02-20 | End: 2022-02-20

## 2022-02-20 RX ORDER — METRONIDAZOLE 500 MG
1 TABLET ORAL
Qty: 18 | Refills: 0
Start: 2022-02-20 | End: 2022-02-25

## 2022-02-20 RX ORDER — CEFPODOXIME PROXETIL 100 MG
1 TABLET ORAL
Qty: 12 | Refills: 0
Start: 2022-02-20 | End: 2022-02-25

## 2022-02-20 RX ADMIN — POLYETHYLENE GLYCOL 3350 17 GRAM(S): 17 POWDER, FOR SOLUTION ORAL at 11:20

## 2022-02-20 RX ADMIN — PANTOPRAZOLE SODIUM 40 MILLIGRAM(S): 20 TABLET, DELAYED RELEASE ORAL at 08:48

## 2022-02-20 RX ADMIN — Medication 100 MILLIGRAM(S): at 13:12

## 2022-02-20 RX ADMIN — Medication 100 MILLIGRAM(S): at 05:32

## 2022-02-20 RX ADMIN — CEFTRIAXONE 100 MILLIGRAM(S): 500 INJECTION, POWDER, FOR SOLUTION INTRAMUSCULAR; INTRAVENOUS at 00:49

## 2022-02-20 NOTE — DISCHARGE NOTE PROVIDER - NSDCCPCAREPLAN_GEN_ALL_CORE_FT
PRINCIPAL DISCHARGE DIAGNOSIS  Diagnosis: Rectal bleeding  Assessment and Plan of Treatment: Follow up with GI outpatiet in 6 weeks for a colonoscopy. You may go to GI doctor referred by your PCP or come to our clinic (information provided)      SECONDARY DISCHARGE DIAGNOSES  Diagnosis: Diverticulitis  Assessment and Plan of Treatment: Complete coures of vantin 200 twice a day and metronidazole 500 three times a day for 6 more days. Follow up with GI as above

## 2022-02-20 NOTE — DISCHARGE NOTE PROVIDER - CARE PROVIDER_API CALL
Pam Golden (MD)  Gastroenterology; Internal Medicine  [address] [place] [address]  Phone: [phone]  Fax: [phone]  Follow Up Time: Routine

## 2022-02-20 NOTE — DISCHARGE NOTE PROVIDER - NSDCMRMEDTOKEN_GEN_ALL_CORE_FT
cefpodoxime 200 mg oral tablet: 1 tab(s) orally 2 times a day   fenofibrate 145 mg oral tablet: 1 tab(s) orally once a day  Janumet XR 50 mg-1000 mg oral tablet, extended release: 1 tab(s) orally once a day (in the evening)  lisinopril 5 mg oral tablet: 1 tab(s) orally once a day  metroNIDAZOLE 500 mg oral tablet: 1 tab(s) orally 3 times a day   Premarin 0.625 mg oral tablet: 1 tab(s) orally once a day

## 2022-02-20 NOTE — DISCHARGE NOTE PROVIDER - HOSPITAL COURSE
62 yo female patient with PMHX of HTN, DM, DLD presents for hematochezia. Pt found to have acute diverticulitis and sigmoid mass. Hematochezia resolved throughout admission. Pt's diverticulitis was treated with IV abx with plan to complete with oral abx. Plan for outpatient colonoscopy in 6 weeks.

## 2022-02-20 NOTE — DISCHARGE NOTE NURSING/CASE MANAGEMENT/SOCIAL WORK - PATIENT PORTAL LINK FT
You can access the FollowMyHealth Patient Portal offered by [facility] by registering at the following website: [email or web address]. By joining [employer]’s FollowMyHealth portal, you will also be able to view your health information using other applications (apps) compatible with our system.

## 2022-02-20 NOTE — DISCHARGE NOTE NURSING/CASE MANAGEMENT/SOCIAL WORK - NSDCPEFALRISK_GEN_ALL_CORE
For information on Fall & Injury Prevention, visit: [email or web address] OR  [email or web address] OR  https://www.cdc.gov/steadi/patient.html

## 2022-02-23 PROBLEM — Z00.00 ENCOUNTER FOR PREVENTIVE HEALTH EXAMINATION: Status: ACTIVE | Noted: 2022-02-23

## 2022-02-25 DIAGNOSIS — E11.9 TYPE 2 DIABETES MELLITUS WITHOUT COMPLICATIONS: ICD-10-CM

## 2022-02-25 DIAGNOSIS — U07.1 COVID-19: ICD-10-CM

## 2022-02-25 DIAGNOSIS — Z79.84 LONG TERM (CURRENT) USE OF ORAL HYPOGLYCEMIC DRUGS: ICD-10-CM

## 2022-02-25 DIAGNOSIS — K62.5 HEMORRHAGE OF ANUS AND RECTUM: ICD-10-CM

## 2022-02-25 DIAGNOSIS — E78.5 HYPERLIPIDEMIA, UNSPECIFIED: ICD-10-CM

## 2022-02-25 DIAGNOSIS — Z85.41 PERSONAL HISTORY OF MALIGNANT NEOPLASM OF CERVIX UTERI: ICD-10-CM

## 2022-02-25 DIAGNOSIS — I10 ESSENTIAL (PRIMARY) HYPERTENSION: ICD-10-CM

## 2022-02-25 DIAGNOSIS — I95.9 HYPOTENSION, UNSPECIFIED: ICD-10-CM

## 2022-02-25 DIAGNOSIS — D64.9 ANEMIA, UNSPECIFIED: ICD-10-CM

## 2022-02-25 DIAGNOSIS — K57.33 DIVERTICULITIS OF LARGE INTESTINE WITHOUT PERFORATION OR ABSCESS WITH BLEEDING: ICD-10-CM

## 2022-02-25 DIAGNOSIS — K63.89 OTHER SPECIFIED DISEASES OF INTESTINE: ICD-10-CM

## 2022-04-08 ENCOUNTER — EMERGENCY (EMERGENCY)
Facility: HOSPITAL | Age: 62
LOS: 0 days | Discharge: HOME | End: 2022-04-09
Attending: EMERGENCY MEDICINE | Admitting: EMERGENCY MEDICINE
Payer: MEDICAID

## 2022-04-08 VITALS
RESPIRATION RATE: 18 BRPM | TEMPERATURE: 98 F | OXYGEN SATURATION: 99 % | WEIGHT: 164.91 LBS | HEART RATE: 75 BPM | SYSTOLIC BLOOD PRESSURE: 131 MMHG | DIASTOLIC BLOOD PRESSURE: 58 MMHG

## 2022-04-08 DIAGNOSIS — M25.572 PAIN IN LEFT ANKLE AND JOINTS OF LEFT FOOT: ICD-10-CM
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
## 2022-04-08 DIAGNOSIS — S82.65XA NONDISPLACED FRACTURE OF LATERAL MALLEOLUS OF LEFT FIBULA, INITIAL ENCOUNTER FOR CLOSED FRACTURE: ICD-10-CM
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
## 2022-04-08 PROCEDURE — 29515 APPLICATION SHORT LEG SPLINT: CPT

## 2022-04-08 PROCEDURE — 99284 EMERGENCY DEPT VISIT MOD MDM: CPT | Mod: 25

## 2022-04-09 PROCEDURE — 73590 X-RAY EXAM OF LOWER LEG: CPT | Mod: 26,LT

## 2022-04-09 PROCEDURE — 73610 X-RAY EXAM OF ANKLE: CPT | Mod: 26,LT

## 2022-04-09 PROCEDURE — 73630 X-RAY EXAM OF FOOT: CPT | Mod: 26,LT

## 2022-04-09 RX ORDER — ACETAMINOPHEN 500 MG
650 TABLET ORAL ONCE
Refills: 0 | Status: COMPLETED | OUTPATIENT
Start: 2022-04-09 | End: 2022-04-09

## 2022-04-09 RX ADMIN — Medication 650 MILLIGRAM(S): at 02:38

## 2022-04-09 RX ADMIN — Medication 650 MILLIGRAM(S): at 03:29

## 2022-04-09 NOTE — ED PROVIDER NOTE - PHYSICAL EXAMINATION
CONSTITUTIONAL: Well-developed; well-nourished; in no acute distress.   SKIN: warm, dry  HEAD: Normocephalic; atraumatic.  EXT: pain with passive flexion/extension of left ankle.  2+ b/l dp pulses. swelling to left ankle. ttp of left lateral malleolus. no fibular head ttp.

## 2022-04-09 NOTE — ED PROVIDER NOTE - OBJECTIVE STATEMENT
The patient is a 62 year old female presenting for left ankle pain. States he was walking and missed a step down the stairs and twisted left ankle. C/o pain and swelling to left ankle over the distal fibula. No HI, no other injuries.

## 2022-04-09 NOTE — ED PROVIDER NOTE - ATTENDING CONTRIBUTION TO CARE
62-year-old female presented for evaluation of left ankle pain that happened prior to arrival.  Patient states she was walking and missed the stairs twisting her left ankle.  Denied any head trauma back pain, headache or dizziness.  Reported localized pain and swelling to left ankle, unable to bear weight without discomfort.  No numbness or weakness.  No additional complaints.    VITAL SIGNS: noted  CONSTITUTIONAL: Well-developed; well-nourished; in no acute distress  HEAD: Normocephalic; atraumatic  EYES: PERRL, EOM intact; conjunctiva and sclera clear  ENT: No nasal discharge; airway clear. MMM  CARD: S1, S2 normal; no murmurs, gallops, or rubs. Regular rate and rhythm  RESP: CTAB/L, no wheezes, rales or rhonchi  ABD: Normal bowel sounds; soft; non-distended; non-tender; no CVA tenderness  EXT: Normal ROM. + swelling and tenderness to left lateral ankle, Distal pulses intact  NEURO: Alert, oriented. Grossly unremarkable. No focal deficits  SKIN: Skin exam is warm and dry  MS: No midline spinal tenderness

## 2022-04-09 NOTE — ED PROVIDER NOTE - CARE PROVIDER_API CALL
Oj Pack (MD)  Orthopaedic Surgery  [address] [place] [address]  Phone: [phone]  Fax: [phone]  Follow Up Time: Urgent

## 2022-04-09 NOTE — ED PROVIDER NOTE - PATIENT PORTAL LINK FT
You can access the FollowMyHealth Patient Portal offered by [facility] by registering at the following website: [email or web address]. By joining [employer]’s FollowMyHealth portal, you will also be able to view your health information using other applications (apps) compatible with our system.

## 2022-04-09 NOTE — ED PROVIDER NOTE - NSFOLLOWUPINSTRUCTIONS_ED_ALL_ED_FT
Ankle Fracture    WHAT YOU NEED TO KNOW:    An ankle fracture is a break in 1 or more of the bones in your ankle. Foot Anatomy         DISCHARGE INSTRUCTIONS:    Call 911 for any of the following:     You feel lightheaded, short of breath, and have chest pain.      You cough up blood.    Return to the emergency department if:     Your leg feels warm, tender, and painful. It may look swollen and red.      Blood soaks through your bandage.      You have severe pain in your ankle.      Your cast feels too tight.      Your foot or toes are cold or numb.      Your foot or toenails turn blue or gray.    Contact your healthcare provider if:     Your splint feels too tight.      Your swelling has increased or returned.      You have a fever.      Your pain does not go away, even after treatment.      You have questions or concerns about your condition or care.    Medicines: You may need any of the following:     Acetaminophen decreases pain and fever. It is available without a doctor's order. Ask how much to take and how often to take it. Follow directions. Acetaminophen can cause liver damage if not taken correctly.      NSAIDs, such as ibuprofen, help decrease swelling, pain, and fever. This medicine is available with or without a doctor's order. NSAIDs can cause stomach bleeding or kidney problems in certain people. If you take blood thinner medicine, always ask your healthcare provider if NSAIDs are safe for you. Always read the medicine label and follow directions.      Prescription pain medicine may be given. Ask your healthcare provider how to take this medicine safely.      Take your medicine as directed. Contact your healthcare provider if you think your medicine is not helping or if you have side effects. Tell him or her if you are allergic to any medicine. Keep a list of the medicines, vitamins, and herbs you take. Include the amounts, and when and why you take them. Bring the list or the pill bottles to follow-up visits. Carry your medicine list with you in case of an emergency.    Follow up with your healthcare provider in 1 to 2 days: Your fracture may need to be reduced (bones pushed back into place) or you may need surgery. Write down your questions so you remember to ask them during your visits.     Support devices: You will be given a brace, cast, or splint to limit your movement and protect your ankle. You may need to use crutches to protect your ankle and decrease your pain as you move around. Do not remove your device and do not put weight on your injured ankle.    Splint and cast care: Cover the splint or cast before you bathe so it does not get wet. Tape 2 plastic trash bags to your skin above the cast. Try to keep your ankle out of the water as much as possible.    Rest: Rest your ankle so that it can heal. Return to normal activities as directed.    Ice: Apply ice on your ankle for 15 to 20 minutes every hour or as directed. Use an ice pack, or put crushed ice in a plastic bag. Cover it with a towel. Ice helps prevent tissue damage and decreases swelling and pain.    Elevate: Elevate your ankle above the level of your heart as often as you can. This will help decrease swelling and pain. Prop your ankle on pillows or blankets to keep it elevated comfortably.        © Copyright [employer] 2019 All illustrations and images included in CareNotes are the copyrighted property of [employer] or [employer].

## 2022-04-09 NOTE — ED ADULT NURSE NOTE - CHIEF COMPLAINT QUOTE
"I was walking down my steps, missed the last one and fell and hurt my left ankle" Ears: no ear pain and no hearing problems. Nose: no nasal congestion and no nasal drainage. Mouth/Throat: no dysphagia, no hoarseness and no throat pain. Neck: no lumps, no pain, no stiffness and no swollen glands.

## 2022-04-09 NOTE — ED PROVIDER NOTE - CLINICAL SUMMARY MEDICAL DECISION MAKING FREE TEXT BOX
Patient evaluated for ankle pain, diagnosed with fibular fracture and splinted.  Given crutches and advised nonweightbearing status.  Tylenol or Motrin as needed for pain.  Patient referred for orthopedic follow-up closely and agreed.  Strict return precautions advised and patient verbalized understanding.

## 2022-04-22 ENCOUNTER — APPOINTMENT (OUTPATIENT)
Dept: GASTROENTEROLOGY | Facility: CLINIC | Age: 62
End: 2022-04-22

## 2022-09-03 ENCOUNTER — EMERGENCY (EMERGENCY)
Facility: HOSPITAL | Age: 62
LOS: 0 days | Discharge: HOME | End: 2022-09-03
Attending: STUDENT IN AN ORGANIZED HEALTH CARE EDUCATION/TRAINING PROGRAM | Admitting: STUDENT IN AN ORGANIZED HEALTH CARE EDUCATION/TRAINING PROGRAM

## 2022-09-03 VITALS
SYSTOLIC BLOOD PRESSURE: 121 MMHG | HEART RATE: 112 BPM | DIASTOLIC BLOOD PRESSURE: 56 MMHG | RESPIRATION RATE: 20 BRPM | OXYGEN SATURATION: 97 %

## 2022-09-03 VITALS
SYSTOLIC BLOOD PRESSURE: 121 MMHG | TEMPERATURE: 96 F | HEART RATE: 73 BPM | RESPIRATION RATE: 20 BRPM | DIASTOLIC BLOOD PRESSURE: 68 MMHG | OXYGEN SATURATION: 92 %

## 2022-09-03 DIAGNOSIS — S06.0X1A CONCUSSION WITH LOSS OF CONSCIOUSNESS OF 30 MINUTES OR LESS, INITIAL ENCOUNTER: ICD-10-CM

## 2022-09-03 DIAGNOSIS — Z79.84 LONG TERM (CURRENT) USE OF ORAL HYPOGLYCEMIC DRUGS: ICD-10-CM

## 2022-09-03 DIAGNOSIS — Z90.710 ACQUIRED ABSENCE OF BOTH CERVIX AND UTERUS: ICD-10-CM

## 2022-09-03 DIAGNOSIS — S09.90XA UNSPECIFIED INJURY OF HEAD, INITIAL ENCOUNTER: ICD-10-CM

## 2022-09-03 DIAGNOSIS — S01.01XA LACERATION WITHOUT FOREIGN BODY OF SCALP, INITIAL ENCOUNTER: ICD-10-CM

## 2022-09-03 DIAGNOSIS — R16.0 HEPATOMEGALY, NOT ELSEWHERE CLASSIFIED: ICD-10-CM

## 2022-09-03 DIAGNOSIS — E11.9 TYPE 2 DIABETES MELLITUS WITHOUT COMPLICATIONS: ICD-10-CM

## 2022-09-03 DIAGNOSIS — Z23 ENCOUNTER FOR IMMUNIZATION: ICD-10-CM

## 2022-09-03 DIAGNOSIS — I10 ESSENTIAL (PRIMARY) HYPERTENSION: ICD-10-CM

## 2022-09-03 DIAGNOSIS — F10.929 ALCOHOL USE, UNSPECIFIED WITH INTOXICATION, UNSPECIFIED: ICD-10-CM
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
## 2022-09-03 DIAGNOSIS — E78.5 HYPERLIPIDEMIA, UNSPECIFIED: ICD-10-CM
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
## 2022-09-03 LAB
ALBUMIN SERPL ELPH-MCNC: 4.5 G/DL — SIGNIFICANT CHANGE UP (ref 3.5–5.2)
ALP SERPL-CCNC: 59 U/L — SIGNIFICANT CHANGE UP (ref 30–115)
ALT FLD-CCNC: 28 U/L — SIGNIFICANT CHANGE UP (ref 0–41)
ANION GAP SERPL CALC-SCNC: 20 MMOL/L — HIGH (ref 7–14)
APPEARANCE UR: CLEAR — SIGNIFICANT CHANGE UP
APTT BLD: 28.7 SEC — SIGNIFICANT CHANGE UP (ref 27–39.2)
AST SERPL-CCNC: 30 U/L — SIGNIFICANT CHANGE UP (ref 0–41)
BACTERIA # UR AUTO: NEGATIVE — SIGNIFICANT CHANGE UP
BASOPHILS # BLD AUTO: 0.04 K/UL — SIGNIFICANT CHANGE UP (ref 0–0.2)
BASOPHILS NFR BLD AUTO: 0.6 % — SIGNIFICANT CHANGE UP (ref 0–1)
BILIRUB SERPL-MCNC: 0.2 MG/DL — SIGNIFICANT CHANGE UP (ref 0.2–1.2)
BILIRUB UR-MCNC: NEGATIVE — SIGNIFICANT CHANGE UP
BUN SERPL-MCNC: 15 MG/DL — SIGNIFICANT CHANGE UP (ref 10–20)
CALCIUM SERPL-MCNC: 9.2 MG/DL — SIGNIFICANT CHANGE UP (ref 8.5–10.1)
CHLORIDE SERPL-SCNC: 107 MMOL/L — SIGNIFICANT CHANGE UP (ref 98–110)
CO2 SERPL-SCNC: 17 MMOL/L — SIGNIFICANT CHANGE UP (ref 17–32)
COLOR SPEC: COLORLESS — SIGNIFICANT CHANGE UP
CREAT SERPL-MCNC: 0.5 MG/DL — LOW (ref 0.7–1.5)
DIFF PNL FLD: NEGATIVE — SIGNIFICANT CHANGE UP
EGFR: 105 ML/MIN/1.73M2 — SIGNIFICANT CHANGE UP
EOSINOPHIL # BLD AUTO: 0.18 K/UL — SIGNIFICANT CHANGE UP (ref 0–0.7)
EOSINOPHIL NFR BLD AUTO: 2.5 % — SIGNIFICANT CHANGE UP (ref 0–8)
EPI CELLS # UR: 1 /HPF — SIGNIFICANT CHANGE UP (ref 0–5)
ETHANOL SERPL-MCNC: 284 MG/DL — HIGH
GLUCOSE SERPL-MCNC: 152 MG/DL — HIGH (ref 70–99)
GLUCOSE UR QL: ABNORMAL
HCT VFR BLD CALC: 43.2 % — SIGNIFICANT CHANGE UP (ref 37–47)
HGB BLD-MCNC: 14.8 G/DL — SIGNIFICANT CHANGE UP (ref 12–16)
HYALINE CASTS # UR AUTO: 1 /LPF — SIGNIFICANT CHANGE UP (ref 0–7)
IMM GRANULOCYTES NFR BLD AUTO: 0.3 % — SIGNIFICANT CHANGE UP (ref 0.1–0.3)
INR BLD: 0.86 RATIO — SIGNIFICANT CHANGE UP (ref 0.65–1.3)
KETONES UR-MCNC: NEGATIVE — SIGNIFICANT CHANGE UP
LACTATE SERPL-SCNC: 3.4 MMOL/L — HIGH (ref 0.7–2)
LEUKOCYTE ESTERASE UR-ACNC: ABNORMAL
LIDOCAIN IGE QN: 27 U/L — SIGNIFICANT CHANGE UP (ref 7–60)
LYMPHOCYTES # BLD AUTO: 3.79 K/UL — HIGH (ref 1.2–3.4)
LYMPHOCYTES # BLD AUTO: 52.4 % — HIGH (ref 20.5–51.1)
MCHC RBC-ENTMCNC: 34 PG — HIGH (ref 27–31)
MCHC RBC-ENTMCNC: 34.3 G/DL — SIGNIFICANT CHANGE UP (ref 32–37)
MCV RBC AUTO: 99.3 FL — HIGH (ref 81–99)
MONOCYTES # BLD AUTO: 0.84 K/UL — HIGH (ref 0.1–0.6)
MONOCYTES NFR BLD AUTO: 11.6 % — HIGH (ref 1.7–9.3)
NEUTROPHILS # BLD AUTO: 2.36 K/UL — SIGNIFICANT CHANGE UP (ref 1.4–6.5)
NEUTROPHILS NFR BLD AUTO: 32.6 % — LOW (ref 42.2–75.2)
NITRITE UR-MCNC: NEGATIVE — SIGNIFICANT CHANGE UP
NRBC # BLD: 0 /100 WBCS — SIGNIFICANT CHANGE UP (ref 0–0)
PH UR: 6 — SIGNIFICANT CHANGE UP (ref 5–8)
PLATELET # BLD AUTO: 204 K/UL — SIGNIFICANT CHANGE UP (ref 130–400)
POTASSIUM SERPL-MCNC: 3.8 MMOL/L — SIGNIFICANT CHANGE UP (ref 3.5–5)
POTASSIUM SERPL-SCNC: 3.8 MMOL/L — SIGNIFICANT CHANGE UP (ref 3.5–5)
PROT SERPL-MCNC: 6.7 G/DL — SIGNIFICANT CHANGE UP (ref 6–8)
PROT UR-MCNC: NEGATIVE — SIGNIFICANT CHANGE UP
PROTHROM AB SERPL-ACNC: 9.9 SEC — LOW (ref 9.95–12.87)
RBC # BLD: 4.35 M/UL — SIGNIFICANT CHANGE UP (ref 4.2–5.4)
RBC # FLD: 13.1 % — SIGNIFICANT CHANGE UP (ref 11.5–14.5)
RBC CASTS # UR COMP ASSIST: 0 /HPF — SIGNIFICANT CHANGE UP (ref 0–4)
SODIUM SERPL-SCNC: 144 MMOL/L — SIGNIFICANT CHANGE UP (ref 135–146)
SP GR SPEC: 1 — LOW (ref 1.01–1.03)
TROPONIN T SERPL-MCNC: <0.01 NG/ML — SIGNIFICANT CHANGE UP
UROBILINOGEN FLD QL: SIGNIFICANT CHANGE UP
WBC # BLD: 7.23 K/UL — SIGNIFICANT CHANGE UP (ref 4.8–10.8)
WBC # FLD AUTO: 7.23 K/UL — SIGNIFICANT CHANGE UP (ref 4.8–10.8)
WBC UR QL: 8 /HPF — HIGH (ref 0–5)

## 2022-09-03 PROCEDURE — 99284 EMERGENCY DEPT VISIT MOD MDM: CPT

## 2022-09-03 PROCEDURE — 70450 CT HEAD/BRAIN W/O DYE: CPT | Mod: 26,MA

## 2022-09-03 PROCEDURE — 71260 CT THORAX DX C+: CPT | Mod: 26,MA

## 2022-09-03 PROCEDURE — 72125 CT NECK SPINE W/O DYE: CPT | Mod: 26,MA

## 2022-09-03 PROCEDURE — 72170 X-RAY EXAM OF PELVIS: CPT | Mod: 26

## 2022-09-03 PROCEDURE — 71045 X-RAY EXAM CHEST 1 VIEW: CPT | Mod: 26

## 2022-09-03 PROCEDURE — 74177 CT ABD & PELVIS W/CONTRAST: CPT | Mod: 26,MA

## 2022-09-03 PROCEDURE — 99285 EMERGENCY DEPT VISIT HI MDM: CPT

## 2022-09-03 RX ORDER — SODIUM CHLORIDE 9 MG/ML
250 INJECTION INTRAMUSCULAR; INTRAVENOUS; SUBCUTANEOUS ONCE
Refills: 0 | Status: COMPLETED | OUTPATIENT
Start: 2022-09-03 | End: 2022-09-03

## 2022-09-03 RX ORDER — TETANUS TOXOID, REDUCED DIPHTHERIA TOXOID AND ACELLULAR PERTUSSIS VACCINE, ADSORBED 5; 2.5; 8; 8; 2.5 [IU]/.5ML; [IU]/.5ML; UG/.5ML; UG/.5ML; UG/.5ML
0.5 SUSPENSION INTRAMUSCULAR ONCE
Refills: 0 | Status: COMPLETED | OUTPATIENT
Start: 2022-09-03 | End: 2022-09-03

## 2022-09-03 RX ADMIN — TETANUS TOXOID, REDUCED DIPHTHERIA TOXOID AND ACELLULAR PERTUSSIS VACCINE, ADSORBED 0.5 MILLILITER(S): 5; 2.5; 8; 8; 2.5 SUSPENSION INTRAMUSCULAR at 02:21

## 2022-09-03 RX ADMIN — SODIUM CHLORIDE 250 MILLILITER(S): 9 INJECTION INTRAMUSCULAR; INTRAVENOUS; SUBCUTANEOUS at 02:20

## 2022-09-03 NOTE — ED PROVIDER NOTE - OBJECTIVE STATEMENT
fall, alc, drunk, unwitnessed, metal bar of exercise machine 62 year old female with phmx of dm, hld, htn comes in for mechanical fall. pt was drinking alcohol earlier tonight and had an unwitnessessed fall after slipping anf falling and hittig a metal bar of an exercise machine with her head. pt is complaining of head pain. unknown loc, +head trauma, -ac.

## 2022-09-03 NOTE — ED PROVIDER NOTE - CLINICAL SUMMARY MEDICAL DECISION MAKING FREE TEXT BOX
trauma w/u w/o acute findings on imaging, incidental hepatomegaly discussed w/ pt. mental status improved in ED. pt clinically sober at time of dc

## 2022-09-03 NOTE — ED ADULT NURSE REASSESSMENT NOTE - NS ED NURSE REASSESS COMMENT FT1
Patient expresses desire to leave. MD notified and spoke with patient. Updated on plan of care and verbalized understanding.

## 2022-09-03 NOTE — ED PROVIDER NOTE - NSFOLLOWUPINSTRUCTIONS_ED_ALL_ED_FT
you have a big liver. please follow up with     Fall Prevention in the Home, Adult  Falls can cause injuries. They can happen to people of all ages. There are many things you can do to make your home safe and to help prevent falls. Ask for help when making these changes, if needed.    What actions can I take to prevent falls?  General Instructions     Use good lighting in all rooms. Replace any light bulbs that burn out.  Turn on the lights when you go into a dark area. Use night-lights.  Keep items that you use often in easy-to-reach places. Lower the shelves around your home if necessary.  Set up your furniture so you have a clear path. Avoid moving your furniture around.  Do not have throw rugs and other things on the floor that can make you trip.  Avoid walking on wet floors.  If any of your floors are uneven, fix them.  Add color or contrast paint or tape to clearly reynaldo and help you see:  Any grab bars or handrails.  First and last steps of stairways.  Where the edge of each step is.  If you use a stepladder:  Make sure that it is fully opened. Do not climb a closed stepladder.  Make sure that both sides of the stepladder are locked into place.  Ask someone to hold the stepladder for you while you use it.  If there are any pets around you, be aware of where they are.  What can I do in the bathroom?     Keep the floor dry. Clean up any water that spills onto the floor as soon as it happens.  Remove soap buildup in the tub or shower regularly.  Use non-skid mats or decals on the floor of the tub or shower.  Attach bath mats securely with double-sided, non-slip rug tape.  If you need to sit down in the shower, use a plastic, non-slip stool.  Install grab bars by the toilet and in the tub and shower. Do not use towel bars as grab bars.  What can I do in the bedroom?     Make sure that you have a light by your bed that is easy to reach.  Do not use any sheets or blankets that are too big for your bed. They should not hang down onto the floor.  Have a firm chair that has side arms. You can use this for support while you get dressed.  What can I do in the kitchen?     Clean up any spills right away.  If you need to reach something above you, use a strong step stool that has a grab bar.  Keep electrical cords out of the way.  Do not use floor polish or wax that makes floors slippery. If you must use wax, use non-skid floor wax.  What can I do with my stairs?     Do not leave any items on the stairs.  Make sure that you have a light switch at the top of the stairs and the bottom of the stairs. If you do not have them, ask someone to add them for you.  Make sure that there are handrails on both sides of the stairs, and use them. Fix handrails that are broken or loose. Make sure that handrails are as long as the stairways.  Install non-slip stair treads on all stairs in your home.  Avoid having throw rugs at the top or bottom of the stairs. If you do have throw rugs, attach them to the floor with carpet tape.  Choose a carpet that does not hide the edge of the steps on the stairway.  Check any carpeting to make sure that it is firmly attached to the stairs. Fix any carpet that is loose or worn.  What can I do on the outside of my home?     Use bright outdoor lighting.  Regularly fix the edges of walkways and driveways and fix any cracks.  Remove anything that might make you trip as you walk through a door, such as a raised step or threshold.  Trim any bushes or trees on the path to your home.  Regularly check to see if handrails are loose or broken. Make sure that both sides of any steps have handrails.  Install guardrails along the edges of any raised decks and porches.  Clear walking paths of anything that might make someone trip, such as tools or rocks.  Have any leaves, snow, or ice cleared regularly.  Use sand or salt on walking paths during winter.  Clean up any spills in your garage right away. This includes grease or oil spills.  What other actions can I take?     Wear shoes that:  Have a low heel. Do not wear high heels.  Have rubber bottoms.  Are comfortable and fit you well.  Are closed at the toe. Do not wear open-toe sandals.  Use tools that help you move around (mobility aids) if they are needed. These include:  Canes.  Walkers.  Scooters.  Crutches.  Review your medicines with your doctor. Some medicines can make you feel dizzy. This can increase your chance of falling.  Ask your doctor what other things you can do to help prevent falls.    Where to find more information  Centers for Disease Control and Prevention, [place]: https://cdc.gov  [employer] [place] [employer]: https://xv7nbro.sylvester.nih.gov  Contact a doctor if:  You are afraid of falling at home.  You feel weak, drowsy, or dizzy at home.  You fall at home.  Summary  There are many simple things that you can do to make your home safe and to help prevent falls.  Ways to make your home safe include removing tripping hazards and installing grab bars in the bathroom.  Ask for help when making these changes in your home.  This information is not intended to replace advice given to you by your health care provider. Make sure you discuss any questions you have with your health care provider.

## 2022-09-03 NOTE — ED PROVIDER NOTE - PHYSICAL EXAMINATION
CONSTITUTIONAL: Well-developed; well-nourished; in no acute distress. gcs 15, speaking in full sentences, moving all extremities  SKIN: warm, dry  HEAD: Normocephalic; see above, small laceration to pareital wound  EYES: PERRL, EOMI, no conjunctival erythema  ENT: No nasal discharge; airway clear, mucous membranes moist  NECK: c collar in place   CARD: +S1, S2 no murmurs, gallops, or rubs. Regular rate and rhythm. radial 2+ b/l, no chest wall tenderness or crepitus  RESP: No wheezes, rales or rhonchi. CTABL  ABD: soft ntnd, no rebound, no guarding, no rigidity  FAST negative  EXT: moves all extremities,  No clubbing, cyanosis or edema.   NEURO: Alert, oriented, grossly unremarkable, cn ii-xii grossly intact, follows commands  PSYCH: Cooperative, appropriate.

## 2022-09-03 NOTE — ED PROVIDER NOTE - NSFOLLOWUPCLINICS_GEN_ALL_ED_FT
[facility]  Trauma Surgery  [address] [place] [address]  Phone: [phone]  Fax:

## 2022-09-03 NOTE — ED PROVIDER NOTE - ATTENDING CONTRIBUTION TO CARE
63 yo f hx htn, dm  pt presents for eval of fall. granddaughter states pt was drinking alcohol tonight. pt had unwitnessed fall w/ head injury to R occipital region.  no vomiting.  pt is a limited historian.    Trauma alert called for +HT w/ AMS   VS  A: intact, protected  B: breath sounds equal b/l, no cyanosis  C: extremities warm and well perfused  D: GCS 15  E:    H: R occipital laceration  Card: RRR, nml s1s2  Pulm: CTAB, breath sounds equal  Abd: S, NT, ND  Spine: no C/T/L spine TTP  Pelvis: stable  Extremities: no gross deformities  Neuro: Awake, alert, no gross focal neuro deficits, moving all extremities    Plan:  Trauma labs  CXR, Pelvis XR  CT panscan    Dispo pending w/u

## 2022-09-03 NOTE — CONSULT NOTE ADULT - SUBJECTIVE AND OBJECTIVE BOX
TRAUMA ACTIVATION LEVEL:  CODE / ALERT  / CONSULT  ACTIVATED BY: EMS**  /  ED**  INTUBATED: YES** / NO**      MECHANISM OF INJURY:   [] Blunt     [] MVC	  [x] Fall	  [] Pedestrian Struck	  [] Motorcycle     [] Assault     [] Bicycle collision    [] Sports injury    [] Penetrating    [] Gun Shot Wound      [] Stab Wound    GCS: 15 	E: 4	V: 5	M: 6    HPI:    62y Female  w/ PMHx of DM, HTN, HLD seen as (Code Trauma / Trauma Alert / Trauma Consult) s/p unwitnessed fall +HT, -AC, LOC?, external signs of trauma include Right parietal 1cm open wound. Patient comes in with granddaughter, reporting falling back and hitting her head with a metal object. Patient reports drinking and does not recall how she fell.    PAST MEDICAL & SURGICAL HISTORY:  HTN (hypertension)      DM (diabetes mellitus)      Dyslipidemia      H/O: hysterectomy          Allergies    No Known Allergies    Intolerances        Home Medications:  fenofibrate 145 mg oral tablet: 1 tab(s) orally once a day (2022 00:14)  Janumet XR 50 mg-1000 mg oral tablet, extended release: 1 tab(s) orally once a day (in the evening) (2022 00:14)  lisinopril 5 mg oral tablet: 1 tab(s) orally once a day (2022 00:13)  Premarin 0.625 mg oral tablet: 1 tab(s) orally once a day (2022 00:14)      ROS: 10-system review is otherwise negative except HPI above.      Primary Survey:    A - airway intact  B - bilateral breath sounds and good chest rise  C - palpable pulses in all extremities  D - GCS 15 on arrival, LYON  Exposure obtained    Vital Signs Last 24 Hrs  T(C): 35.7 (03 Sep 2022 01:37), Max: 35.7 (03 Sep 2022 01:37)  T(F): 96.3 (03 Sep 2022 01:37), Max: 96.3 (03 Sep 2022 01:37)  HR: 112 (03 Sep 2022 01:43) (73 - 112)  BP: 121/56 (03 Sep 2022 01:43) (121/56 - 121/68)  BP(mean): --  RR: 20 (03 Sep 2022 01:43) (20 - 20)  SpO2: 97% (03 Sep 2022 01:43) (92% - 97%)    Parameters below as of 03 Sep 2022 01:43  Patient On (Oxygen Delivery Method): room air        Secondary Survey:   General: NAD  HEENT: Normocephalic, atraumatic, EOMI, PEERLA. no scalp lacerations   Neck: Soft, midline trachea. no c-spine tenderness  Chest: No chest wall tenderness, no subcutaneous emphysema   Cardiac: RRR  Respiratory: Bilateral breath sounds, clear and equal bilaterally  Abdomen: Soft, non-distended, non-tender, no rebound, no guarding.  Groin: Normal appearing, pelvis stable   Ext:  Moving b/l upper and lower extremities. Palpable Radial b/l UE, b/l DP palpable in LE.   Back: No T/L/S spine tenderness, No palpable runoff/stepoff/deformity      ACCESS / DEVICES:  [ x] Peripheral IV  [ ] Central Venous Line	[ ] R	[ ] L	[ ] IJ	[ ] Fem	[ ] SC	Placed:   [ ] Arterial Line		[ ] R	[ ] L	[ ] Fem	[ ] Rad	[ ] Ax	Placed:   [ ] PICC:					[ ] Mediport  [ ] Urinary Catheter,  Date Placed:   [ ] Chest tube: [ ] Right, [ ] Left  [ ] DEVI/Jackson Drains    Labs:  CAPILLARY BLOOD GLUCOSE      POCT Blood Glucose.: 141 mg/dL (03 Sep 2022 01:44)                          14.8   7.23  )-----------( 204      ( 03 Sep 2022 01:55 )             43.2       Auto Neutrophil %: 32.6 % (22 @ 01:55)  Auto Immature Granulocyte %: 0.3 % (22 @ 01:55)        144  |  107  |  15  ----------------------------<  152<H>  3.8   |  17  |  0.5<L>      Calcium, Total Serum: 9.2 mg/dL (22 @ 01:55)      LFTs:             6.7  | 0.2  | 30       ------------------[59      ( 03 Sep 2022 01:55 )  4.5  | x    | 28          Lipase:27     Amylase:x         Lactate, Blood: 3.4 mmol/L (22 @ 01:55)      Coags:     9.90   ----< 0.86    ( 03 Sep 2022 01:55 )     28.7        CARDIAC MARKERS ( 03 Sep 2022 01:55 )  x     / <0.01 ng/mL / x     / x     / x            Alcohol, Blood: 284 mg/dL (22 @ 01:55)    Urinalysis Basic - ( 03 Sep 2022 02:26 )    Color: Colorless / Appearance: Clear / S.004 / pH: x  Gluc: x / Ketone: Negative  / Bili: Negative / Urobili: <2 mg/dL   Blood: x / Protein: Negative / Nitrite: Negative   Leuk Esterase: Moderate / RBC: 0 /HPF / WBC 8 /HPF   Sq Epi: x / Non Sq Epi: 1 /HPF / Bacteria: Negative            Alcohol, Blood: 284 mg/dL (22 @ 01:55)      RADIOLOGY & ADDITIONAL STUDIES:  ---------------------------------------------------------------------------------------  < from: CT Abdomen and Pelvis w/ IV Cont (22 @ 03:24) >    IMPRESSION:    No CT evidence of acute intrathoracic or intra-abdominal traumatic   pathology.    Diffuse hepatic steatosis.      < end of copied text >  < from: CT Chest w/ IV Cont (22 @ 03:24) >    IMPRESSION:    No CT evidence of acute intrathoracic or intra-abdominal traumatic   pathology.    Diffuse hepatic steatosis.        ******PRELIMINARY REPORT******      ******PRELIMINARY REPORT******     < end of copied text >  < from: CT Cervical Spine No Cont (22 @ 03:03) >    IMPRESSION:    CT HEAD:  Streak artifact from left scalp overlying metallic substance.  No evidence of acute intracranial hemorrhage or large territorial infarct.    CT CERVICAL SPINE:    No evidence of a cervical spine fracture or subluxation.    Straightening of the cervical lordosis secondary to positioning or muscle   spasm.        ******PRELIMINARY REPORT******      ******PRELIMINARY REPORT******       JEMIMA BISWAS MD; Resident Radiologist  This document is a PRELIMINARY interpretation and is pending final   attending approval. Sep  3 2022  3:46AM    < end of copied text >  < from: CT Head No Cont (22 @ 02:59) >  IMPRESSION:    CT HEAD:  Streak artifact from left scalp overlying metallic substance.  No evidence of acute intracranial hemorrhage or large territorial infarct.    CT CERVICAL SPINE:    No evidence of a cervical spine fracture or subluxation.    Straightening of the cervical lordosis secondary to positioning or muscle   spasm.        < end of copied text >       TRAUMA ACTIVATION LEVEL:  CODE / ALERT  / CONSULT  ACTIVATED BY: EMS**  /  ED**  INTUBATED: YES** / NO**      MECHANISM OF INJURY:   [] Blunt     [] MVC	  [x] Fall	  [] Pedestrian Struck	  [] Motorcycle     [] Assault     [] Bicycle collision    [] Sports injury    [] Penetrating    [] Gun Shot Wound      [] Stab Wound    GCS: 15 	E: 4	V: 5	M: 6    HPI:    62y Female  w/ PMHx of DM, HTN, HLD seen as (Code Trauma / Trauma Alert / Trauma Consult) s/p unwitnessed fall +HT, -AC, LOC?, external signs of trauma include Right parietal 1cm open wound. Patient comes in with granddaughter, reporting falling back and hitting her head with a metal object. Patient reports drinking and does not recall how she fell.    PAST MEDICAL & SURGICAL HISTORY:  HTN (hypertension)      DM (diabetes mellitus)      Dyslipidemia      H/O: hysterectomy          Allergies    No Known Allergies    Intolerances        Home Medications:  fenofibrate 145 mg oral tablet: 1 tab(s) orally once a day (2022 00:14)  Janumet XR 50 mg-1000 mg oral tablet, extended release: 1 tab(s) orally once a day (in the evening) (2022 00:14)  lisinopril 5 mg oral tablet: 1 tab(s) orally once a day (2022 00:13)  Premarin 0.625 mg oral tablet: 1 tab(s) orally once a day (2022 00:14)      ROS: 10-system review is otherwise negative except HPI above.      Primary Survey:    A - airway intact  B - bilateral breath sounds and good chest rise  C - palpable pulses in all extremities  D - GCS 15 on arrival, LYON  Exposure obtained    Vital Signs Last 24 Hrs  T(C): 35.7 (03 Sep 2022 01:37), Max: 35.7 (03 Sep 2022 01:37)  T(F): 96.3 (03 Sep 2022 01:37), Max: 96.3 (03 Sep 2022 01:37)  HR: 112 (03 Sep 2022 01:43) (73 - 112)  BP: 121/56 (03 Sep 2022 01:43) (121/56 - 121/68)  BP(mean): --  RR: 20 (03 Sep 2022 01:43) (20 - 20)  SpO2: 97% (03 Sep 2022 01:43) (92% - 97%)    Parameters below as of 03 Sep 2022 01:43  Patient On (Oxygen Delivery Method): room air        Secondary Survey:   General: NAD  HEENT: Normocephalic, Right parietal 1cm open wound  Neck: Soft, midline trachea. no c-spine tenderness  Chest: No chest wall tenderness, no subcutaneous emphysema   Cardiac: RRR  Respiratory: Bilateral breath sounds, clear and equal bilaterally  Abdomen: Soft, non-distended, non-tender, no rebound, no guarding.  Groin: Normal appearing, pelvis stable   Ext:  Moving b/l upper and lower extremities. Palpable Radial b/l UE, b/l DP palpable in LE.   Back: No T/L/S spine tenderness, No palpable runoff/stepoff/deformity      ACCESS / DEVICES:  [ x] Peripheral IV  [ ] Central Venous Line	[ ] R	[ ] L	[ ] IJ	[ ] Fem	[ ] SC	Placed:   [ ] Arterial Line		[ ] R	[ ] L	[ ] Fem	[ ] Rad	[ ] Ax	Placed:   [ ] PICC:					[ ] Mediport  [ ] Urinary Catheter,  Date Placed:   [ ] Chest tube: [ ] Right, [ ] Left  [ ] DEVI/Jackson Drains    Labs:  CAPILLARY BLOOD GLUCOSE      POCT Blood Glucose.: 141 mg/dL (03 Sep 2022 01:44)                          14.8   7.23  )-----------( 204      ( 03 Sep 2022 01:55 )             43.2       Auto Neutrophil %: 32.6 % (22 @ 01:55)  Auto Immature Granulocyte %: 0.3 % (22 @ 01:55)        144  |  107  |  15  ----------------------------<  152<H>  3.8   |  17  |  0.5<L>      Calcium, Total Serum: 9.2 mg/dL (22 @ 01:55)      LFTs:             6.7  | 0.2  | 30       ------------------[59      ( 03 Sep 2022 01:55 )  4.5  | x    | 28          Lipase:27     Amylase:x         Lactate, Blood: 3.4 mmol/L (22 @ 01:55)      Coags:     9.90   ----< 0.86    ( 03 Sep 2022 01:55 )     28.7        CARDIAC MARKERS ( 03 Sep 2022 01:55 )  x     / <0.01 ng/mL / x     / x     / x            Alcohol, Blood: 284 mg/dL (22 @ 01:55)    Urinalysis Basic - ( 03 Sep 2022 02:26 )    Color: Colorless / Appearance: Clear / S.004 / pH: x  Gluc: x / Ketone: Negative  / Bili: Negative / Urobili: <2 mg/dL   Blood: x / Protein: Negative / Nitrite: Negative   Leuk Esterase: Moderate / RBC: 0 /HPF / WBC 8 /HPF   Sq Epi: x / Non Sq Epi: 1 /HPF / Bacteria: Negative            Alcohol, Blood: 284 mg/dL (22 @ 01:55)      RADIOLOGY & ADDITIONAL STUDIES:  ---------------------------------------------------------------------------------------  < from: CT Abdomen and Pelvis w/ IV Cont (22 @ 03:24) >    IMPRESSION:    No CT evidence of acute intrathoracic or intra-abdominal traumatic   pathology.    Diffuse hepatic steatosis.      < end of copied text >  < from: CT Chest w/ IV Cont (22 @ 03:24) >    IMPRESSION:    No CT evidence of acute intrathoracic or intra-abdominal traumatic   pathology.    Diffuse hepatic steatosis.        ******PRELIMINARY REPORT******      ******PRELIMINARY REPORT******     < end of copied text >  < from: CT Cervical Spine No Cont (22 @ 03:03) >    IMPRESSION:    CT HEAD:  Streak artifact from left scalp overlying metallic substance.  No evidence of acute intracranial hemorrhage or large territorial infarct.    CT CERVICAL SPINE:    No evidence of a cervical spine fracture or subluxation.    Straightening of the cervical lordosis secondary to positioning or muscle   spasm.        ******PRELIMINARY REPORT******      ******PRELIMINARY REPORT******       JEMIMA BISWAS MD; Resident Radiologist  This document is a PRELIMINARY interpretation and is pending final   attending approval. Sep  3 2022  3:46AM    < end of copied text >  < from: CT Head No Cont (22 @ 02:59) >  IMPRESSION:    CT HEAD:  Streak artifact from left scalp overlying metallic substance.  No evidence of acute intracranial hemorrhage or large territorial infarct.    CT CERVICAL SPINE:    No evidence of a cervical spine fracture or subluxation.    Straightening of the cervical lordosis secondary to positioning or muscle   spasm.        < end of copied text >       TRAUMA ACTIVATION LEVEL:  alert  ACTIVATED BY: ED  INTUBATED: no    MECHANISM OF INJURY:   [] Blunt     [] MVC	  [x] Fall	    GCS: 15 	E: 4	V: 5	M: 6    HPI:  62y Female  w/ PMHx of DM, HTN, HLD seen as Trauma Alert s/p unwitnessed fall +HT, -AC, LOC?, external signs of trauma include Right parietal 1cm open wound. Patient comes in with granddaughter, reporting falling back and hitting her head on a metal object. Patient reports drinking alcohol and does not recall how she fell.    PAST MEDICAL & SURGICAL HISTORY:  HTN (hypertension)  DM (diabetes mellitus)  Dyslipidemia  H/O: hysterectomy    Allergies  No Known Allergies    Home Medications:  fenofibrate 145 mg oral tablet: 1 tab(s) orally once a day (2022 00:14)  Janumet XR 50 mg-1000 mg oral tablet, extended release: 1 tab(s) orally once a day (in the evening) (2022 00:14)  lisinopril 5 mg oral tablet: 1 tab(s) orally once a day (2022 00:13)  Premarin 0.625 mg oral tablet: 1 tab(s) orally once a day (2022 00:14)    ROS: 10-system review is otherwise negative except HPI above.      Primary Survey:    A - airway intact  B - bilateral breath sounds and good chest rise  C - palpable pulses in all extremities  D - GCS 15 on arrival, LYON  Exposure obtained    Vital Signs Last 24 Hrs  T(C): 35.7 (03 Sep 2022 01:37), Max: 35.7 (03 Sep 2022 01:37)  T(F): 96.3 (03 Sep 2022 01:37), Max: 96.3 (03 Sep 2022 01:37)  HR: 112 (03 Sep 2022 01:43) (73 - 112)  BP: 121/56 (03 Sep 2022 01:43) (121/56 - 121/68)  BP(mean): --  RR: 20 (03 Sep 2022 01:43) (20 - 20)  SpO2: 97% (03 Sep 2022 01:43) (92% - 97%)    Parameters below as of 03 Sep 2022 01:43  Patient On (Oxygen Delivery Method): room air      Secondary Survey:   General: NAD  HEENT: Normocephalic, Right parietal 1cm open wound  Neck: Soft, midline trachea. no c-spine tenderness  Chest: No chest wall tenderness, no subcutaneous emphysema   Cardiac: RRR  Respiratory: Bilateral breath sounds, clear and equal bilaterally  Abdomen: Soft, non-distended, non-tender, no rebound, no guarding.  Groin: Normal appearing, pelvis stable   Ext:  Moving b/l upper and lower extremities. Palpable Radial b/l UE, b/l DP palpable in LE.   Back: No T/L/S spine tenderness, No palpable runoff/stepoff/deformity      ACCESS / DEVICES:  [ x] Peripheral IV    Labs:  CAPILLARY BLOOD GLUCOSE  POCT Blood Glucose.: 141 mg/dL (03 Sep 2022 01:44)                        14.8   7.23  )-----------( 204      ( 03 Sep 2022 01:55 )             43.2       Auto Neutrophil %: 32.6 % (22 @ 01:55)  Auto Immature Granulocyte %: 0.3 % (22 @ 01:55)      144  |  107  |  15  ----------------------------<  152<H>  3.8   |  17  |  0.5<L>    Calcium, Total Serum: 9.2 mg/dL (22 @ 01:55)    LFTs:       6.7  | 0.2  | 30       ------------------[59      ( 03 Sep 2022 01:55 )  4.5  | x    | 28          Lipase:27        Lactate, Blood: 3.4 mmol/L (22 @ 01:55)    Coags:  9.90   ----< 0.86    ( 03 Sep 2022 01:55 )     28.7      CARDIAC MARKERS ( 03 Sep 2022 01:55 )  x     / <0.01 ng/mL / x     / x     / x          Alcohol, Blood: 284 mg/dL (22 @ 01:55)    Urinalysis Basic - ( 03 Sep 2022 02:26 )    Color: Colorless / Appearance: Clear / S.004 / pH: x  Gluc: x / Ketone: Negative  / Bili: Negative / Urobili: <2 mg/dL   Blood: x / Protein: Negative / Nitrite: Negative   Leuk Esterase: Moderate / RBC: 0 /HPF / WBC 8 /HPF   Sq Epi: x / Non Sq Epi: 1 /HPF / Bacteria: Negative      Alcohol, Blood: 284 mg/dL (22 @ 01:55)    RADIOLOGY & ADDITIONAL STUDIES:  --------------------------------------------------------------------------------------  < from: CT Head No Cont (22 @ 02:59) >  IMPRESSION:    CT HEAD:  Streak artifact from left scalp overlying metallic substance.  Within limitations of artifact, no evidence of acute intracranial pathology.  Right posterior parietal scalp hematoma/laceration.    CT CERVICAL SPINE:  No evidence of a cervical spine fracture or subluxation.    --- End of Report ---  JEMIMA BISWAS MD; Resident Radiologist  This document has been electronically signed.  MARGARITA CASTANEDA MD; Attending Radiologist  This document has been electronically signed. Sep  3 2022  6:37AM  < end of copied text >    < from: CT Chest w/ IV Cont (22 @ 03:24) >  IMPRESSION:  No CT evidence of acute intrathoracic or intra-abdominal traumatic   pathology.    Hepatomegaly and hepatic steatosis.    Distended urinary bladder.    --- End of Report ---  JEMIMA BISWAS MD; Resident Radiologist  This document has been electronically signed.  MARGARITA CASTANEDA MD; Attending Radiologist  This document has been electronically signed. Sep  3 2022  6:51AM  < end of copied text >      < from: Xray Pelvis AP only (22 @ 02:20) >  Impression:    No evidence for acute fracture or dislocation.    --- End of Report ---  MARGARITA CASTANEDA MD; Attending Radiologist  This document has been electronically signed. Sep  3 2022  6:52AM  < end of copied text >

## 2022-09-03 NOTE — ED ADULT TRIAGE NOTE - CHIEF COMPLAINT QUOTE
Pt BIBA S/P fall hit R side of with laceration alcohol intoxication/ unwitnessed fall. PTA patient was altered

## 2022-09-03 NOTE — CONSULT NOTE ADULT - ASSESSMENT
ASSESSMENT:  62y Female  w/ PMHx of DM, HTN, HLD seen as (Code Trauma / Trauma Alert / Trauma Consult) s/p unwitnessed fall +HT, -AC, LOC?, external signs of trauma include Right parietal 1cm open wound.Trauma assessment in ED: ABCs intact , GCS 15 , AAOx3,  LYON.     Injuries identified:   - Right parietal 1cm open wound    PLAN:   - Trauma Labs: (CBC, BMP, Coags, T&S, UA, EtOH level)  Additional studies:  EKG  Utox    Trauma Imaging to include the following:  - CXR, Pelvic Xray  - CT Head,  CT C-spine, CT Chest, CT Abd/Pelvis  - Extremity films: None      Disposition pending results of above labs and imaging  Above plan discussed with Trauma attending, Dr. Burroughs , patient, patient family, and ED team  --------------------------------------------------------------------------------------  09-03-22 @ 04:46    TRAUMA SENIOR SPECTRA: [id number]  TRAUMA TEAM SPECTRA: [id number] ASSESSMENT:  62y Female  w/ PMHx of DM, HTN, HLD seen as Trauma Alert s/p unwitnessed fall +HT, -AC, LOC?, external signs of trauma include Right parietal 1cm open wound.Trauma assessment in ED: ABCs intact , GCS 15 , AAOx3,  LYON.     Injuries identified:   - Right parietal 1cm open wound    PLAN:   - Senior Note  I have personally examined and evaluated the patient  I agree with the above plan and I have edited where appropriate  External signs of injury on exam: 1cm posterior right scalp wound  CTs reviewed, as above. No acute traumatic findings  No acute trauma surgical intervention.  Plain film wet reads as per ED  Dispo as per ED  If pt admitted, trauma team to perform TTS in AM  Surgical Attending aware and agrees with plan     Above plan discussed with Trauma attending, Dr. Burroughs and ED  --------------------------------------------------------------------------------------  09-03-22 @ 04:46    TRAUMA SENIOR SPECTRA: [id number]  TRAUMA TEAM SPECTRA: [id number]

## 2022-09-03 NOTE — ED PROVIDER NOTE - PATIENT PORTAL LINK FT
You can access the FollowMyHealth Patient Portal offered by [facility] by registering at the following website: [email or web address]. By joining [facility]’s FollowMyHealth portal, you will also be able to view your health information using other applications (apps) compatible with our system.

## 2023-10-24 NOTE — ED ADULT TRIAGE NOTE - ACCOMPANIED BY
Caller: Dahiana Kirby PT    Doctor/Office: Dr Sayra Marin     Call regarding :  Medication interaction      Call was transferred to: Nurse EMT/paramedic

## 2024-03-31 NOTE — CONSULT NOTE ADULT - ATTENDING COMMENTS
PAST SURGICAL HISTORY:  History of dilation and curettage      This is 61 y/o female  w/ PMHx of DM, HTN, HLD seen as Trauma Alert s/p unwitnessed fall +HT, -AC, LOC?, external signs of trauma include Right parietal 1cm open wound. Patient comes in with granddaughter, reporting falling back and hitting her head on a metal object. Patient reports drinking alcohol and does not recall how she fell.    Primary and secondary surveys were performed.    AAO x3  GCS 15  Neuro: moves all 4 extremities  Neck: no step-offs  Left occipital scalp laceration around 3 cm.  Chest: clear.  CV : RRR  Abdomen; soft, nontender  Extr: no deformities.    All images and labs were reviewed.    ASSESSMENT:  61 y/o female, S/P Fall.  Concussion with brief LOC.  Scalp laceration.  Acute alcohol intoxication.    PLAN:  Patient was observed over several hours and remained hemodynamically and neurologically stable.  Further disposition as per ED.